# Patient Record
Sex: MALE | Race: WHITE | HISPANIC OR LATINO | Employment: FULL TIME | URBAN - METROPOLITAN AREA
[De-identification: names, ages, dates, MRNs, and addresses within clinical notes are randomized per-mention and may not be internally consistent; named-entity substitution may affect disease eponyms.]

---

## 2023-06-20 ENCOUNTER — OFFICE VISIT (OUTPATIENT)
Dept: PRIMARY CARE CLINIC | Facility: CLINIC | Age: 43
End: 2023-06-20
Payer: COMMERCIAL

## 2023-06-20 VITALS
BODY MASS INDEX: 27.61 KG/M2 | TEMPERATURE: 98 F | HEART RATE: 92 BPM | WEIGHT: 192.88 LBS | SYSTOLIC BLOOD PRESSURE: 112 MMHG | OXYGEN SATURATION: 98 % | HEIGHT: 70 IN | DIASTOLIC BLOOD PRESSURE: 78 MMHG

## 2023-06-20 DIAGNOSIS — F41.9 ANXIETY: ICD-10-CM

## 2023-06-20 DIAGNOSIS — Z76.89 ESTABLISHING CARE WITH NEW DOCTOR, ENCOUNTER FOR: Primary | ICD-10-CM

## 2023-06-20 DIAGNOSIS — F32.A DEPRESSION, UNSPECIFIED DEPRESSION TYPE: ICD-10-CM

## 2023-06-20 DIAGNOSIS — F90.9 ATTENTION DEFICIT HYPERACTIVITY DISORDER (ADHD), UNSPECIFIED ADHD TYPE: ICD-10-CM

## 2023-06-20 PROCEDURE — 3074F SYST BP LT 130 MM HG: CPT | Mod: CPTII,S$GLB,, | Performed by: STUDENT IN AN ORGANIZED HEALTH CARE EDUCATION/TRAINING PROGRAM

## 2023-06-20 PROCEDURE — 99999 PR PBB SHADOW E&M-NEW PATIENT-LVL V: CPT | Mod: PBBFAC,,, | Performed by: STUDENT IN AN ORGANIZED HEALTH CARE EDUCATION/TRAINING PROGRAM

## 2023-06-20 PROCEDURE — 3074F PR MOST RECENT SYSTOLIC BLOOD PRESSURE < 130 MM HG: ICD-10-PCS | Mod: CPTII,S$GLB,, | Performed by: STUDENT IN AN ORGANIZED HEALTH CARE EDUCATION/TRAINING PROGRAM

## 2023-06-20 PROCEDURE — 99999 PR PBB SHADOW E&M-NEW PATIENT-LVL V: ICD-10-PCS | Mod: PBBFAC,,, | Performed by: STUDENT IN AN ORGANIZED HEALTH CARE EDUCATION/TRAINING PROGRAM

## 2023-06-20 PROCEDURE — 99386 PREV VISIT NEW AGE 40-64: CPT | Mod: S$GLB,,, | Performed by: STUDENT IN AN ORGANIZED HEALTH CARE EDUCATION/TRAINING PROGRAM

## 2023-06-20 PROCEDURE — 99386 PR PREVENTIVE VISIT,NEW,40-64: ICD-10-PCS | Mod: S$GLB,,, | Performed by: STUDENT IN AN ORGANIZED HEALTH CARE EDUCATION/TRAINING PROGRAM

## 2023-06-20 PROCEDURE — 3078F PR MOST RECENT DIASTOLIC BLOOD PRESSURE < 80 MM HG: ICD-10-PCS | Mod: CPTII,S$GLB,, | Performed by: STUDENT IN AN ORGANIZED HEALTH CARE EDUCATION/TRAINING PROGRAM

## 2023-06-20 PROCEDURE — 3078F DIAST BP <80 MM HG: CPT | Mod: CPTII,S$GLB,, | Performed by: STUDENT IN AN ORGANIZED HEALTH CARE EDUCATION/TRAINING PROGRAM

## 2023-06-20 PROCEDURE — 3008F PR BODY MASS INDEX (BMI) DOCUMENTED: ICD-10-PCS | Mod: CPTII,S$GLB,, | Performed by: STUDENT IN AN ORGANIZED HEALTH CARE EDUCATION/TRAINING PROGRAM

## 2023-06-20 PROCEDURE — 1159F MED LIST DOCD IN RCRD: CPT | Mod: CPTII,S$GLB,, | Performed by: STUDENT IN AN ORGANIZED HEALTH CARE EDUCATION/TRAINING PROGRAM

## 2023-06-20 PROCEDURE — 3008F BODY MASS INDEX DOCD: CPT | Mod: CPTII,S$GLB,, | Performed by: STUDENT IN AN ORGANIZED HEALTH CARE EDUCATION/TRAINING PROGRAM

## 2023-06-20 PROCEDURE — 1159F PR MEDICATION LIST DOCUMENTED IN MEDICAL RECORD: ICD-10-PCS | Mod: CPTII,S$GLB,, | Performed by: STUDENT IN AN ORGANIZED HEALTH CARE EDUCATION/TRAINING PROGRAM

## 2023-06-20 RX ORDER — DEXTROAMPHETAMINE SACCHARATE, AMPHETAMINE ASPARTATE, DEXTROAMPHETAMINE SULFATE AND AMPHETAMINE SULFATE 3.75; 3.75; 3.75; 3.75 MG/1; MG/1; MG/1; MG/1
TABLET ORAL
COMMUNITY

## 2023-06-20 RX ORDER — ARIPIPRAZOLE 30 MG/1
TABLET ORAL
COMMUNITY

## 2023-06-20 NOTE — PROGRESS NOTES
Harsha García  1980        Subjective     Chief Complaint: Est Care    History of Present Illness:  Mr. Harsha García is a 43 y.o. male who presents to clinic for est care.    ADHD/Depression/Anxiety-On Adderall and Abilify. Was on Lexapro as well but recently stopped. Felling less jittery without it. Seeing Psychiatry [facility]. Needs new one. Would be open to BHI. Kids and work biggest stress.     Brother with Type I DM.  Mom with renal transplant. Unsure etiology.     No chest pain or SOB.   No change in BMs. Denies blood in stools.     Overall feeling well.     Review of Systems   Constitutional:  Negative for chills, fever and malaise/fatigue.   HENT:  Negative for congestion and sore throat.    Respiratory:  Negative for cough and shortness of breath.    Cardiovascular:  Negative for chest pain and leg swelling.   Gastrointestinal:  Negative for abdominal pain, diarrhea, nausea and vomiting.   Skin:  Negative for rash.   Neurological:  Negative for dizziness and headaches.      PAST HISTORY:     Past Medical History:   Diagnosis Date    ADHD        Past Surgical History:   Procedure Laterality Date    LASIK Bilateral        Family History   Problem Relation Age of Onset    Hypertension Mother     Kidney failure Mother     Diabetes type I Brother        Social History     Socioeconomic History    Marital status:    Tobacco Use    Smoking status: Never     Passive exposure: Never    Smokeless tobacco: Never       MEDICATIONS & ALLERGIES:     Current Outpatient Medications on File Prior to Visit   Medication Sig    ARIPiprazole (ABILIFY) 30 MG Tab     dextroamphetamine-amphetamine (ADDERALL) 15 mg tablet      No current facility-administered medications on file prior to visit.       Review of patient's allergies indicates:  No Known Allergies    OBJECTIVE:     Vital Signs:  Vitals:    06/20/23 1250   BP: 112/78   BP Location: Left arm   Patient Position: Sitting   BP Method: Large  "(Manual)   Pulse: 92   Temp: 98.3 °F (36.8 °C)   TempSrc: Oral   SpO2: 98%   Weight: 87.5 kg (192 lb 14.4 oz)   Height: 5' 10" (1.778 m)       Body mass index is 27.68 kg/m².     Physical Exam:  Physical Exam  Vitals and nursing note reviewed.   Constitutional:       General: He is not in acute distress.     Appearance: Normal appearance. He is not ill-appearing, toxic-appearing or diaphoretic.   HENT:      Head: Normocephalic and atraumatic.      Right Ear: Tympanic membrane, ear canal and external ear normal. There is no impacted cerumen.      Left Ear: Tympanic membrane, ear canal and external ear normal. There is no impacted cerumen.      Mouth/Throat:      Mouth: Mucous membranes are moist.      Pharynx: No oropharyngeal exudate.   Eyes:      General: No scleral icterus.        Right eye: No discharge.         Left eye: No discharge.      Conjunctiva/sclera: Conjunctivae normal.   Cardiovascular:      Rate and Rhythm: Normal rate and regular rhythm.      Heart sounds: No murmur heard.  Pulmonary:      Effort: Pulmonary effort is normal.      Breath sounds: Normal breath sounds. No wheezing.   Musculoskeletal:      Cervical back: Normal range of motion and neck supple. No rigidity.      Right lower leg: No edema.      Left lower leg: No edema.   Lymphadenopathy:      Cervical: No cervical adenopathy.   Skin:     General: Skin is warm and dry.   Neurological:      Mental Status: He is alert and oriented to person, place, and time.   Psychiatric:         Mood and Affect: Mood and affect normal.         Behavior: Behavior normal.          Laboratory  No results found for: WBC, HGB, HCT, MCV, PLT  No results found for: GLU, NA, K, CL, CO2, BUN, CREATININE, CALCIUM, MG  No results found for: INR, PROTIME  No results found for: HGBA1C        Health Maintenance         Date Due Completion Date    Hepatitis C Screening Never done ---    Lipid Panel Never done ---    HIV Screening Never done ---    TETANUS VACCINE Never " done ---    Hemoglobin A1c (Diabetic Prevention Screening) Never done ---              ASSESSMENT & PLAN:   Mr. Harsha García is a 43 y.o. male who was seen today in clinic for [facility] care.       1. Establishing care with new doctor, encounter for  -     CBC Auto Differential; Future; Expected date: 06/20/2023  -     Comprehensive Metabolic Panel; Future; Expected date: 06/20/2023  -     Hemoglobin A1C; Future; Expected date: 06/20/2023  -     Lipid Panel; Future; Expected date: 06/20/2023  -     TSH; Future; Expected date: 06/20/2023  -     Hepatitis C Antibody; Future; Expected date: 06/20/2023  -     HIV 1/2 Ag/Ab (4th Gen); Future; Expected date: 06/20/2023  -     Ambulatory referral/consult to Primary Care Behavioral Health (Non-Opioids); Future; Expected date: 06/27/2023  -     Ambulatory referral/consult to Psychology; Future; Expected date: 06/27/2023    2. Attention deficit hyperactivity disorder (ADHD), unspecified ADHD type    3. Anxiety  -     Ambulatory referral/consult to Primary Care Behavioral Health (Non-Opioids); Future; Expected date: 06/27/2023  -     Ambulatory referral/consult to Psychology; Future; Expected date: 06/27/2023    4. Depression, unspecified depression type  -     Ambulatory referral/consult to Primary Care Behavioral Health (Non-Opioids); Future; Expected date: 06/27/2023  -     Ambulatory referral/consult to Psychology; Future; Expected date: 06/27/2023           Barbie Velazquez MD  Internal Medicine         Portions of this note may have been generated using voice recognition software.  Please excuse any spelling/grammatical errors. Occasional wrong-word or sound-a-like substitutions may have also occurred due to the inherent limitations of voice recognition software. Please read the chart carefully and recognize, using context, where substitutions have occurred.

## 2023-06-21 ENCOUNTER — PATIENT MESSAGE (OUTPATIENT)
Dept: BEHAVIORAL HEALTH | Facility: CLINIC | Age: 43
End: 2023-06-21
Payer: COMMERCIAL

## 2023-06-21 ENCOUNTER — LAB VISIT (OUTPATIENT)
Dept: LAB | Facility: HOSPITAL | Age: 43
End: 2023-06-21
Payer: COMMERCIAL

## 2023-06-21 DIAGNOSIS — Z76.89 ESTABLISHING CARE WITH NEW DOCTOR, ENCOUNTER FOR: ICD-10-CM

## 2023-06-21 LAB
ALBUMIN SERPL BCP-MCNC: 4.2 G/DL (ref 3.5–5.2)
ALP SERPL-CCNC: 59 U/L (ref 55–135)
ALT SERPL W/O P-5'-P-CCNC: 32 U/L (ref 10–44)
ANION GAP SERPL CALC-SCNC: 13 MMOL/L (ref 8–16)
AST SERPL-CCNC: 20 U/L (ref 10–40)
BASOPHILS # BLD AUTO: 0.04 K/UL (ref 0–0.2)
BASOPHILS NFR BLD: 0.5 % (ref 0–1.9)
BILIRUB SERPL-MCNC: 0.3 MG/DL (ref 0.1–1)
BUN SERPL-MCNC: 14 MG/DL (ref 6–20)
CALCIUM SERPL-MCNC: 9.5 MG/DL (ref 8.7–10.5)
CHLORIDE SERPL-SCNC: 107 MMOL/L (ref 95–110)
CHOLEST SERPL-MCNC: 168 MG/DL (ref 120–199)
CHOLEST/HDLC SERPL: 3.5 {RATIO} (ref 2–5)
CO2 SERPL-SCNC: 24 MMOL/L (ref 23–29)
CREAT SERPL-MCNC: 1.2 MG/DL (ref 0.5–1.4)
DIFFERENTIAL METHOD: ABNORMAL
EOSINOPHIL # BLD AUTO: 0.2 K/UL (ref 0–0.5)
EOSINOPHIL NFR BLD: 3 % (ref 0–8)
ERYTHROCYTE [DISTWIDTH] IN BLOOD BY AUTOMATED COUNT: 12.5 % (ref 11.5–14.5)
EST. GFR  (NO RACE VARIABLE): >60 ML/MIN/1.73 M^2
ESTIMATED AVG GLUCOSE: 105 MG/DL (ref 68–131)
GLUCOSE SERPL-MCNC: 100 MG/DL (ref 70–110)
HBA1C MFR BLD: 5.3 % (ref 4–5.6)
HCT VFR BLD AUTO: 46.1 % (ref 40–54)
HCV AB SERPL QL IA: NORMAL
HDLC SERPL-MCNC: 48 MG/DL (ref 40–75)
HDLC SERPL: 28.6 % (ref 20–50)
HGB BLD-MCNC: 15.2 G/DL (ref 14–18)
HIV 1+2 AB+HIV1 P24 AG SERPL QL IA: NORMAL
IMM GRANULOCYTES # BLD AUTO: 0.04 K/UL (ref 0–0.04)
IMM GRANULOCYTES NFR BLD AUTO: 0.5 % (ref 0–0.5)
LDLC SERPL CALC-MCNC: 109 MG/DL (ref 63–159)
LYMPHOCYTES # BLD AUTO: 2.5 K/UL (ref 1–4.8)
LYMPHOCYTES NFR BLD: 33.7 % (ref 18–48)
MCH RBC QN AUTO: 31.7 PG (ref 27–31)
MCHC RBC AUTO-ENTMCNC: 33 G/DL (ref 32–36)
MCV RBC AUTO: 96 FL (ref 82–98)
MONOCYTES # BLD AUTO: 0.6 K/UL (ref 0.3–1)
MONOCYTES NFR BLD: 8.6 % (ref 4–15)
NEUTROPHILS # BLD AUTO: 4 K/UL (ref 1.8–7.7)
NEUTROPHILS NFR BLD: 53.7 % (ref 38–73)
NONHDLC SERPL-MCNC: 120 MG/DL
NRBC BLD-RTO: 0 /100 WBC
PLATELET # BLD AUTO: 221 K/UL (ref 150–450)
PMV BLD AUTO: 10.4 FL (ref 9.2–12.9)
POTASSIUM SERPL-SCNC: 4.4 MMOL/L (ref 3.5–5.1)
PROT SERPL-MCNC: 7.3 G/DL (ref 6–8.4)
RBC # BLD AUTO: 4.79 M/UL (ref 4.6–6.2)
SODIUM SERPL-SCNC: 144 MMOL/L (ref 136–145)
TRIGL SERPL-MCNC: 55 MG/DL (ref 30–150)
TSH SERPL DL<=0.005 MIU/L-ACNC: 1.95 UIU/ML (ref 0.4–4)
WBC # BLD AUTO: 7.41 K/UL (ref 3.9–12.7)

## 2023-06-21 PROCEDURE — 87389 HIV-1 AG W/HIV-1&-2 AB AG IA: CPT | Performed by: STUDENT IN AN ORGANIZED HEALTH CARE EDUCATION/TRAINING PROGRAM

## 2023-06-21 PROCEDURE — 36415 COLL VENOUS BLD VENIPUNCTURE: CPT | Mod: PN | Performed by: STUDENT IN AN ORGANIZED HEALTH CARE EDUCATION/TRAINING PROGRAM

## 2023-06-21 PROCEDURE — 83036 HEMOGLOBIN GLYCOSYLATED A1C: CPT | Performed by: STUDENT IN AN ORGANIZED HEALTH CARE EDUCATION/TRAINING PROGRAM

## 2023-06-21 PROCEDURE — 80061 LIPID PANEL: CPT | Performed by: STUDENT IN AN ORGANIZED HEALTH CARE EDUCATION/TRAINING PROGRAM

## 2023-06-21 PROCEDURE — 85025 COMPLETE CBC W/AUTO DIFF WBC: CPT | Performed by: STUDENT IN AN ORGANIZED HEALTH CARE EDUCATION/TRAINING PROGRAM

## 2023-06-21 PROCEDURE — 84443 ASSAY THYROID STIM HORMONE: CPT | Performed by: STUDENT IN AN ORGANIZED HEALTH CARE EDUCATION/TRAINING PROGRAM

## 2023-06-21 PROCEDURE — 80053 COMPREHEN METABOLIC PANEL: CPT | Performed by: STUDENT IN AN ORGANIZED HEALTH CARE EDUCATION/TRAINING PROGRAM

## 2023-06-21 PROCEDURE — 86803 HEPATITIS C AB TEST: CPT | Performed by: STUDENT IN AN ORGANIZED HEALTH CARE EDUCATION/TRAINING PROGRAM

## 2023-06-27 ENCOUNTER — TELEPHONE (OUTPATIENT)
Dept: BEHAVIORAL HEALTH | Facility: CLINIC | Age: 43
End: 2023-06-27
Payer: COMMERCIAL

## 2023-06-27 ENCOUNTER — PATIENT MESSAGE (OUTPATIENT)
Dept: BEHAVIORAL HEALTH | Facility: CLINIC | Age: 43
End: 2023-06-27
Payer: COMMERCIAL

## 2023-06-27 NOTE — PROGRESS NOTES
CHW received call back from patient. Pt sees Dr. Kishore Coppola outside of [facility] and ADHD is well-controlled on medications. Pt would like to address his stress from job, kids and family in BHI Program. Schedule pt on 7/5/2023 at 12:00pm virtual visit.  Sent intake assessments to patient portal. Pt will complete them this afternoon. Discussed 988/911/ED.    Behavioral Health Community Health Worker  Initial Assessment  Completed by:  Malini Erickson     Date:  6/27/2023    Patient Enrollment in Behavioral Health Program:  Patient verbalized understanding of Behavioral Health Integration services to include:  Patient understands that CHW, LCSW, PharmD and consulting Psychiatrist are members of the care team working collaboratively with his/her primary care provider: Yes  Patient understands that activation of their [facility] patient portal account is required for accessing the full scope of team services: Yes  Patient understands that some counseling sessions may occur via video: Yes  Clinic visits with the psychiatrist may be subject to a co-pay based on your insurance: Yes  Patient consents to enroll in BHI program: Yes    Assessments     Single Item Health Literacy Scale:  How often do you need to have someone help you read instructions, pamphlets or other written material from your doctor or pharmacy?: Never    Promis 10:  Promis 10 Responses  In general, would you say your health is: Good  In general, would you say your quality of life is: Fair  In general, how would you rate your physical health?: Very good  In general, how would you rate your mental health, including your mood and your ability to think?: Poor  In general, how would you rate your satisfaction with your social activities and relationships?: Fair  In general, please rate how well you carry out your usual social activities and roles. (This includes activities at home, at work and in your community, and responsibilities as a parent,  "child, spouse, employee, friend, etc.): Fair  To what extent are you able to carry out your everyday physical activities such as walking, climbing stairs, carrying groceries, or moving a chair? : Completely  How often have you been bothered by emotional problems such as feeling anxious, depressed or irritable?: Always  In the past 7 days, how would you rate your fatigue on average?: Severe  In the past 7 days, on a scale of 0 to 10 (where 0 is no pain and 10 is the worst pain imaginable) how would you rate your pain on average?: 0  Global Physical Health: 13  Global Mental health Score: 10    Depression PHQ8 on 6/27/2023 at 1:10pm score is "14"  No flowsheet data found.      Generalized Anxiety Disorder 7-Item Scale:  GAD7 6/27/2023   1. Feeling nervous, anxious, or on edge? 3   2. Not being able to stop or control worrying? 3   3. Worrying too much about different things? 2   4. Trouble relaxing? 2   5. Being so restless that it is hard to sit still? 2   6. Becoming easily annoyed or irritable? 2   7. Feeling afraid as if something awful might happen? 0   CRIS-7 Score 14       History     Social History     Socioeconomic History    Marital status:    Tobacco Use    Smoking status: Never     Passive exposure: Never    Smokeless tobacco: Never       Call Summary     Addendum:   Patient was referred to the BHI (Non-opioid) program by Primary Care Provider, Dr. Barbie Velazquez.  CHW contacted Harsha García who reports depression and anxiety that limits his activities of daily living (ADLs).   Patient scored "14" on the PHQ9 and "14" on the CRIS 7. Based on these scores patient is eligible for the Behavioral Health Integration (Non-opioid) Program. CHW completed the intake and scheduled a virtual appointment for patient with Aleksey Heller LPC on Wednesday, 7/5/2023 at 12:00pm. Patient completed assessments via patient portal, Discussed 932/060/ED. Patient sees Dr. Jian Coppola outside of [facility] " for ADHD medications. Patient stated his ADHD is well managed on medications but he has having stress with his work, kids and family and would like to work on those areas. Patient denies SI/HI.

## 2023-06-28 NOTE — PROGRESS NOTES
"Primary Care Behavioral Health Integration: Initial  Date:  07/05/2023  Patient Name: Harsha García  Referral Source:  Barbie Velazquez MD  Type of Visit:  Video Session    Visit type: Virtual visit with synchronous audio and video  The patient location is:  car  The patient location [place] is:  [place]  The patient phone number is: [phone]    Each patient to whom he or she provides medical services by telemedicine is:  (1) informed of the relationship between the physician and patient and the respective role of any other health care provider with respect to management of the patient; and (2) notified that he or she may decline to receive medical services by telemedicine and may withdraw from such care at any time.    Chief complaint/reason for encounter: anger, anxiety, and interpersonal      History of Present Illness:  Harsha García, a 43 y.o.  male with history of Anxiety disorders; anxiety, unspecified [F41.9] and Attention-Deficit Hyperactivity Disorder: Predominantly Inattentive Subtype (F90.2) referred by Barbie Velazquez MD.  Patient was seen, examined and chart was reviewed.    Met with patient     Patient began this Initial Assessment by stating [facility].  That therapist left for a high-paying job.  Stated he has not seen a therapist since.  Stated he has been diagnosed with Attention Deficit Hyperactivity Disorder, Inattentive Type.  Noted he experiences a lot of anxiety and stress as it relates to his current job as an  and his daily interactions with his children.  Stated his children are "very challenging."  Patient has a son who is 9 years old and a daughter who is 13 years old.  Reported he has always had a strained relationship with daughter.  Both son and daughter, according to patient, have been diagnosed with ADHD.  Stated he is very dissatisfied with job.  He was fired from former job in February 2023 because "my work was " "sloppy, and they had found out I was billing for cases I had not seen."  Stated in his current job, he remains stressed, he is not motivated to get work done, and he has to endure a pay cut from previous job.  He is looking for a new job in the same line of work.  Is considering going back to work at [employer] where he enjoyed the work he did there.  Noted his relationship with daughter, in particular, is very strained.  In addition having been diagnosed with ADHD, daughter has also been diagnosed with a mood disorder.  She is currently in therapy.  He has attended some of her sessions to get a better understanding of what is going on with her.  There is resentment between patient and his wife.  He feels wife does a much better job of dealing with their children, would like to be more like her and have her temperament, but it has been very difficult.  Is also undergoing financial difficulties because of a lower-paying job.  [facility] sent patient Tool 1 - Identification of Triggers to Anxiety.  Will review during follow-up session.        Past Medical History:   Diagnosis Date    ADHD          Current Outpatient Medications:     ARIPiprazole (ABILIFY) 30 MG Tab, , Disp: , Rfl:     dextroamphetamine-amphetamine (ADDERALL) 15 mg tablet, , Disp: , Rfl:     Current Symptoms:  Depression: worthlessness/guilt, hopelessness, fatigue, and difficulty concentrating.    Anxiety: excessive worrying and restlessness.    Sleep:  Reported no problems with sleep .    Chrissie/Hypomania:  denies.    Psychosis: denies .  Suicidal thoughts/behaviors: Denied Confirmed    Patient noted agreement to call 911/and or present to the ED if she experienced suicidal or homicidal ideation, plan or intent.    Self-injury:  Denied. Confirmed.  Trauma: Denied. Confirmed.    Risk Assessment:  Patient reports no suicidal ideation  Patient reports no homicidal ideation  Patient reports no self-injurious behavior  Patient reports no violent behavior    Patient " advised to call [phone] or present the the nearest ED if they experience suicidal or homicidal ideation, plan or intent.    Discussed and agreed on a safety plan.    Psychiatric History:  Is the patient taking psychiatric medication: Yes - Adderall 15 mg and Abilify 30 mg.  Pt is taking Adderall 15 mg for ADHD.  Pt is taking Abilify for 30 mg  They are not interested in medication changes.  Previous Medication Trials: Yes Pt has taken escitalopram (Lexapro) unknown mg once daily for Depression.  Previous Psychiatric Outpatient Treatment:  Yes - [facility] [place] - [place], [place]  Previous Psychiatric Hospitalizations:  No  Previous Suicide Attempts:  No  History of Trauma:  No  Access to a Firearm:  No    Substance Use History:  Tobacco/Nicotine:  No   Alcohol:  beer - few a week on average 5 a week.  Illicit Substances: No  Misuse of Prescription Medications:  No  Caffeine: Yes - coffee     Mental Status Exam  General Appearance:  unremarkable, age appropriate     Speech: normal tone, normal rate, normal pitch, normal volume         Level of Cooperation: cooperative        Thought Processes: normal and logical      Mood: anxious        Thought Content: normal, no suicidality, no homicidality, delusions, or paranoia     Affect: congruent and appropriate    Orientation: Oriented x3     Memory: recent >  intact, remote >  intact     Attention Span & Concentration: intact     Fund of General Knowledge: intact and appropriate to age and level of education   Abstract Reasoning: interpretation of similarities was concrete   Judgment & Insight: good       Language:  intact       Results of the PHQ8 6/27/2023   1. Little interest or pleasure in doing things More than half the days   2. Feeling down, depressed, or hopeless Nearly every day   3. Trouble falling or staying asleep, or sleeping too much Nearly every day   4. Feeling tired or having little energy Nearly every day   5. poor appetite or  overeating Not at all   6. Feeling bad about yourself - or that you are a failure or have let yourself or your family down More than half the days   7. Trouble concentrating on things, such as reading the newspaper or watching television Several days   8. Moving or speaking so slowly that other people could have noticed? Or the opposite - being so fidgety or restless that you have been moving around a lot more than usual Not at all   Total Score  14        GAD7 6/27/2023   1. Feeling nervous, anxious, or on edge? 3   2. Not being able to stop or control worrying? 3   3. Worrying too much about different things? 2   4. Trouble relaxing? 2   5. Being so restless that it is hard to sit still? 2   6. Becoming easily annoyed or irritable? 2   7. Feeling afraid as if something awful might happen? 0   CRIS-7 Score 14       Impression:   My diagnostic impression is patient is experiencing worrying, depressed mood, fatigue, difficulty concentrating, and guilt.  These symptoms are making it difficult for patient to function effectively.      Provisional Diagnosis:  No diagnosis found.    Treatment Goals and Plan: Initial appointment focused on gathering history, identifying treatment goals and developing a treatment plan.      Anxiety: reducing negative automatic thoughts, reducing physical symptoms of anxiety, and reducing time spent worrying (<30 minutes/day)  Wants to develop a more nurturing relationship with daughter; wants to find a new job in the same line of work as an .      Future treatment will utilize CBT, Problem-solving Therapy, and Solution-focused Therapy.      Return to Clinic: 1 week    Plan to discuss case with [facility] consulting psychiatrist and further recommendations to be potentially be made at that time.  Refer to psychiatry    Length of Appointment:  38 minutes spent face-to-face and 15 minutes spent in non face-to-face clinical care.

## 2023-07-03 ENCOUNTER — TELEPHONE (OUTPATIENT)
Dept: BEHAVIORAL HEALTH | Facility: CLINIC | Age: 43
End: 2023-07-03
Payer: COMMERCIAL

## 2023-07-03 ENCOUNTER — PATIENT MESSAGE (OUTPATIENT)
Dept: BEHAVIORAL HEALTH | Facility: CLINIC | Age: 43
End: 2023-07-03
Payer: COMMERCIAL

## 2023-07-03 NOTE — PROGRESS NOTES
CHW reached out to  new pt to remind him of virtual appointment with Aleksey Heller LPC Wednesday. Pt confirmed the appointment.  Sent virtual visit instructions to pt with [phone] if unable to login.

## 2023-07-05 ENCOUNTER — PATIENT MESSAGE (OUTPATIENT)
Dept: BEHAVIORAL HEALTH | Facility: CLINIC | Age: 43
End: 2023-07-05
Payer: COMMERCIAL

## 2023-07-05 ENCOUNTER — CLINICAL SUPPORT (OUTPATIENT)
Dept: BEHAVIORAL HEALTH | Facility: CLINIC | Age: 43
End: 2023-07-05
Payer: COMMERCIAL

## 2023-07-05 DIAGNOSIS — Z76.89 ESTABLISHING CARE WITH NEW DOCTOR, ENCOUNTER FOR: ICD-10-CM

## 2023-07-05 DIAGNOSIS — F41.9 ANXIETY: ICD-10-CM

## 2023-07-05 DIAGNOSIS — F90.0 ADHD (ATTENTION DEFICIT HYPERACTIVITY DISORDER), INATTENTIVE TYPE: ICD-10-CM

## 2023-07-05 DIAGNOSIS — F32.A DEPRESSION, UNSPECIFIED DEPRESSION TYPE: ICD-10-CM

## 2023-07-05 DIAGNOSIS — F41.9 ANXIETY DISORDER, UNSPECIFIED TYPE: Primary | ICD-10-CM

## 2023-07-05 PROCEDURE — 90791 PR PSYCHIATRIC DIAGNOSTIC EVALUATION: ICD-10-PCS | Mod: 95,,, | Performed by: COUNSELOR

## 2023-07-05 PROCEDURE — 90791 PSYCH DIAGNOSTIC EVALUATION: CPT | Mod: 95,,, | Performed by: COUNSELOR

## 2023-07-13 ENCOUNTER — TELEPHONE (OUTPATIENT)
Dept: BEHAVIORAL HEALTH | Facility: CLINIC | Age: 43
End: 2023-07-13
Payer: COMMERCIAL

## 2023-07-13 NOTE — PROGRESS NOTES
CHW reached out to pt to remind him of virtual appointment with Aleksey Heller LPC tomorrow. No answer, LVM of the appointment.

## 2023-07-14 ENCOUNTER — PATIENT MESSAGE (OUTPATIENT)
Dept: BEHAVIORAL HEALTH | Facility: CLINIC | Age: 43
End: 2023-07-14
Payer: COMMERCIAL

## 2023-07-14 ENCOUNTER — CLINICAL SUPPORT (OUTPATIENT)
Dept: BEHAVIORAL HEALTH | Facility: CLINIC | Age: 43
End: 2023-07-14
Payer: COMMERCIAL

## 2023-07-14 ENCOUNTER — TELEPHONE (OUTPATIENT)
Dept: BEHAVIORAL HEALTH | Facility: CLINIC | Age: 43
End: 2023-07-14
Payer: COMMERCIAL

## 2023-07-14 DIAGNOSIS — F41.9 ANXIETY DISORDER, UNSPECIFIED TYPE: Primary | ICD-10-CM

## 2023-07-14 PROCEDURE — 90832 PSYTX W PT 30 MINUTES: CPT | Mod: 95,,, | Performed by: COUNSELOR

## 2023-07-14 PROCEDURE — 90832 PR PSYCHOTHERAPY W/PATIENT, 30 MIN: ICD-10-PCS | Mod: 95,,, | Performed by: COUNSELOR
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
## 2023-07-14 NOTE — PROGRESS NOTES
Patient had trouble logging in for 12pm appointment and rescheduled for 130 pm today     Normal rate, regular rhythm, normal S1, S2 heart sounds heard. Normal rate, regular rhythm, normal S1, S2 heart sounds heard.
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
## 2023-07-28 ENCOUNTER — TELEPHONE (OUTPATIENT)
Dept: BEHAVIORAL HEALTH | Facility: CLINIC | Age: 43
End: 2023-07-28
Payer: COMMERCIAL

## 2023-07-28 ENCOUNTER — PATIENT MESSAGE (OUTPATIENT)
Dept: BEHAVIORAL HEALTH | Facility: CLINIC | Age: 43
End: 2023-07-28
Payer: COMMERCIAL

## 2023-07-28 NOTE — PROGRESS NOTES
CHW reached out to pt to remind him of virtual appointment with Aleksey Heller LPC tomorrow. No answer, LVM of the appointment.  Sent patient portal message appointment reminder.

## 2023-07-31 ENCOUNTER — PATIENT MESSAGE (OUTPATIENT)
Dept: BEHAVIORAL HEALTH | Facility: CLINIC | Age: 43
End: 2023-07-31

## 2023-07-31 ENCOUNTER — CLINICAL SUPPORT (OUTPATIENT)
Dept: BEHAVIORAL HEALTH | Facility: CLINIC | Age: 43
End: 2023-07-31
Payer: COMMERCIAL

## 2023-07-31 DIAGNOSIS — F41.9 ANXIETY DISORDER, UNSPECIFIED TYPE: Primary | ICD-10-CM

## 2023-07-31 PROCEDURE — 90832 PR PSYCHOTHERAPY W/PATIENT, 30 MIN: ICD-10-PCS | Mod: 95,,, | Performed by: COUNSELOR

## 2023-07-31 PROCEDURE — 90832 PSYTX W PT 30 MINUTES: CPT | Mod: 95,,, | Performed by: COUNSELOR

## 2023-08-11 ENCOUNTER — PATIENT MESSAGE (OUTPATIENT)
Dept: BEHAVIORAL HEALTH | Facility: CLINIC | Age: 43
End: 2023-08-11
Payer: COMMERCIAL

## 2023-08-14 ENCOUNTER — TELEPHONE (OUTPATIENT)
Dept: BEHAVIORAL HEALTH | Facility: CLINIC | Age: 43
End: 2023-08-14
Payer: COMMERCIAL

## 2023-08-14 NOTE — PROGRESS NOTES
"Primary Care Behavioral Health Integration: Follow-up  Date:  08/15/2023  Patient Name: Harsha García  Referral Source:  Barbie Velazquez MD  Type of Visit:  Video Session  Site:  [facility]    Visit type: Virtual visit with synchronous audio and video  The patient location is:  car  The patient location Parish is:  [facility]  The patient phone number is: [phone]    Each patient to whom he or she provides medical services by telemedicine is:  (1) informed of the relationship between the physician and patient and the respective role of any other health care provider with respect to management of the patient; and (2) notified that he or she may decline to receive medical services by telemedicine and may withdraw from such care at any time.    History of Present Illness:  Harsha García, a 43 y.o.  male with history of Anxiety disorders; anxiety, unspecified [F41.9] referred by Barbie Velazquez MD.  Patient was seen, examined and chart was reviewed.    Met with patient.       Patient began this session by stating his entire family had COVID except him.  He has been taking care of them as needed.  Patient reported he drove his daughter, Jasper, to her last therapy session 10 days ago.  Daughter's therapist discussed with patient the fact that daughter has been reluctant to participate in therapy sessions and therapist does not know the reasons.  One takeaway from that discussion was that patient and wife are to maintain very consistent rules which prevent her from engaging in violent behavior with mother.  LPC and patient reviewed the Thinking Errors sheet and two thinking error in particular:  "blowing things up" and "ignoring the good."  Patient used the example of having to take a deposition in [place].  NOTE:  At this point in the session, LPC and patient experienced technical difficulties during which video portion of the session froze.  LPC then switched to an audio session.  For " "deposition, patient reported he was focused on everything going wrong from not asking the "right" questions to not getting enough information from his client.  Noted he has often struggled with having to "think on his feet," which he feels is a necessary attribute to being a .  The second example he used was his interactions with his daughter, Jasper, and how he often ignores the good she does.  LPC encouraged patient to begin visualizing the REACTION lead to TOXICITY concept BEFORE actually reacting to his daughter.  Also encouraged patient and his wife to discuss a structured reward system to implement with daughter for doing good.  LPC sent patient the Tool 4 - Generate A List of Unhealthy Go-To Coping Skills of the CBT Toolbox workbook.  Will review patient's completed worksheet during follow-up appointment on August 30, 2023.          Results of the PHQ8 7/31/2023 7/3/2023 6/27/2023   1. Little interest or pleasure in doing things Several days More than half the days More than half the days   2. Feeling down, depressed, or hopeless Several days Nearly every day Nearly every day   3. Trouble falling or staying asleep, or sleeping too much Several days Nearly every day Nearly every day   4. Feeling tired or having little energy More than half the days Nearly every day Nearly every day   5. poor appetite or overeating Not at all Not at all Not at all   6. Feeling bad about yourself - or that you are a failure or have let yourself or your family down Nearly every day Nearly every day More than half the days   7. Trouble concentrating on things, such as reading the newspaper or watching television Several days Not at all Several days   8. Moving or speaking so slowly that other people could have noticed? Or the opposite - being so fidgety or restless that you have been moving around a lot more than usual Not at all Not at all Not at all   Total Score  9 14 14       GAD7 7/31/2023 7/3/2023 6/27/2023   1. " Feeling nervous, anxious, or on edge? 1 3 3   2. Not being able to stop or control worrying? 1 3 3   3. Worrying too much about different things? 1 3 2   4. Trouble relaxing? 1 3 2   5. Being so restless that it is hard to sit still? 1 2 2   6. Becoming easily annoyed or irritable? 1 3 2   7. Feeling afraid as if something awful might happen? 0 3 0   CRIS-7 Score 6 20 14       Mental Status Exam  General Appearance:  unremarkable, age appropriate     Speech: normal tone, normal rate, normal pitch, normal volume         Level of Cooperation: cooperative        Thought Processes: normal and logical      Mood: anxious        Thought Content: normal, no suicidality, no homicidality, delusions, or paranoia     Affect: congruent and appropriate    Orientation: Oriented x3     Memory: recent >  intact, remote >  intact     Attention Span & Concentration: intact     Fund of General Knowledge: intact and appropriate to age and level of education   Abstract Reasoning: interpretation of similarities was concrete   Judgment & Insight: good       Language:  intact       Treatment plan:  Target symptoms: anxiety , work stress  Why chosen therapy is appropriate versus another modality: relevant to diagnosis  Outcome monitoring methods: self-report  Therapeutic intervention type: insight oriented psychotherapy, behavior modifying psychotherapy, supportive psychotherapy    Risk parameters:  Patient reports no suicidal ideation  Patient reports no homicidal ideation  Patient reports no self-injurious behavior  Patient reports no violent behavior    Verbal deficits: None    Patient's response to intervention:  The patient's response to intervention is accepting.    Progress toward goals and other mental status changes:  The patient's progress toward goals is fair .    Patient advised to call [phone] or present the the nearest ED if they experience suicidal or homicidal ideation, plan or intent.       Impression:  My diagnostic  impression is patient continues to experience excessive worrying, difficulty relaxing, difficulty concentrating, and feeling on edge.  These symptoms are making it difficult for patient to function effectively.    Diagnosis:   Generalized Anxiety Disorder [F41.1]    Treatment Goals and Plan: Pt plans to continue CBT, Problem-solving Therapy, and Solution-focused Therapy    Future treatment will utilize CBT, Problem-solving Therapy, and Solution-focused Therapy    Return to Clinic: 2 weeks    Plan to discuss case with [facility] consulting psychiatrist and further recommendations to be potentially be made at that time.  Refer to psychiatry    Length of Appointment:  35 minutes spent face-to-face and 12 minutes spent in non face-to-face clinical care.

## 2023-08-15 ENCOUNTER — CLINICAL SUPPORT (OUTPATIENT)
Dept: BEHAVIORAL HEALTH | Facility: CLINIC | Age: 43
End: 2023-08-15
Payer: COMMERCIAL

## 2023-08-15 ENCOUNTER — PATIENT MESSAGE (OUTPATIENT)
Dept: BEHAVIORAL HEALTH | Facility: CLINIC | Age: 43
End: 2023-08-15

## 2023-08-15 DIAGNOSIS — F41.1 GENERALIZED ANXIETY DISORDER: Primary | ICD-10-CM

## 2023-08-15 PROCEDURE — 90832 PSYTX W PT 30 MINUTES: CPT | Mod: 95,,, | Performed by: COUNSELOR

## 2023-08-15 PROCEDURE — 90832 PR PSYCHOTHERAPY W/PATIENT, 30 MIN: ICD-10-PCS | Mod: 95,,, | Performed by: COUNSELOR

## 2023-08-28 NOTE — PROGRESS NOTES
"Primary Care Behavioral Health Integration: Follow-up  Date:  08/30/2023  Patient Name: Harsha García  Referral Source:  Barbie Velazquez MD  Type of Visit:  Video Session  Site:  [facility]    Visit type: Virtual visit with synchronous audio and video  The patient location is:  car ([place] and [place])  The patient location [place] is:  [facility]   The patient phone number is: [phone]    Each patient to whom he or she provides medical services by telemedicine is:  (1) informed of the relationship between the physician and patient and the respective role of any other health care provider with respect to management of the patient; and (2) notified that he or she may decline to receive medical services by telemedicine and may withdraw from such care at any time.    History of Present Illness:  Harsha García, a 43 y.o.  male with history of Anxiety disorders; generalized anxiety disorder [F41.1] referred by Barbie Velazquez MD.  Patient was seen, examined and chart was reviewed.    Met with patient.       The first 17 minutes of this session was conducted via video.  Technical issues began around the 17 minute isael.  LPC then contacted patient via telephone to complete the remainder of the session.  Total face-to-face time LPC spent with patient was 38 minutes.  Patient began this session by stating he has been "super busy" with getting kids to school, son starting soccer, and work commitments.  Reported his daughter, Jasper, is already experiencing issues with school.  She has refused to do assignments, and she has performed poorly on exams.  Noted she will receive accommodations to assist her.  LPC and patient reviewed patient's completed Tool 4 worksheet.  Patient listed procrastinating as one of his primary unhealthy go-to coping skills.  LPC and patient explored barriers to getting work done.  LPC encouraged patient to begin completing small, manageable tasks in specific time windows with the " given task listed.  LPC sent patient the Tool 5 - Recognition of Consequences of the CBT Toolbox workbook.  Will review patient's completed worksheet during follow-up appointment on September 13, 2023.               8/15/2023    11:58 AM 7/31/2023     9:53 AM 7/3/2023     1:20 PM 6/27/2023     1:11 PM   Results of the PHQ8   Little interest or pleasure in doing things More than half the days Several days More than half the days More than half the days   Feeling down, depressed, or hopeless Nearly every day Several days Nearly every day Nearly every day   Trouble falling or staying asleep, or sleeping too much Not at all Several days Nearly every day Nearly every day   Feeling tired or having little energy More than half the days More than half the days Nearly every day Nearly every day   Poor appetite or overeating Not at all Not at all Not at all Not at all   Feeling bad about yourself - or that you are a failure or have let yourself or your family down Nearly every day Nearly every day Nearly every day More than half the days   Trouble concentrating on things, such as reading the newspaper or watching television More than half the days Several days Not at all Several days   Moving or speaking so slowly that other people could have noticed. Or the opposite - being so fidgety or restless that you have been moving around a lot more than usual Not at all Not at all Not at all Not at all   Total Score  12 9 14 14           8/15/2023    11:58 AM 7/31/2023     9:52 AM 7/3/2023     1:19 PM   GAD7   1. Feeling nervous, anxious, or on edge? 2 1 3   2. Not being able to stop or control worrying? 3 1 3   3. Worrying too much about different things? 3 1 3   4. Trouble relaxing? 3 1 3   5. Being so restless that it is hard to sit still? 3 1 2   6. Becoming easily annoyed or irritable? 2 1 3   7. Feeling afraid as if something awful might happen? 1 0 3   CRIS-7 Score 17 6 20       Mental Status Exam  General Appearance:   unremarkable, age appropriate     Speech: normal tone, normal pitch, normal volume, rapid         Level of Cooperation: cooperative        Thought Processes: normal and logical      Mood: anxious        Thought Content: normal, no suicidality, no homicidality, delusions, or paranoia     Affect: anxious    Orientation: Oriented x3     Memory: recent >  intact, remote >  intact     Attention Span & Concentration: intact     Fund of General Knowledge: intact and appropriate to age and level of education   Abstract Reasoning: interpretation of similarities was concrete   Judgment & Insight: fair       Language:  intact       Treatment plan:  Target symptoms: anxiety , work stress  Why chosen therapy is appropriate versus another modality: relevant to diagnosis  Outcome monitoring methods: self-report  Therapeutic intervention type: insight oriented psychotherapy, behavior modifying psychotherapy, supportive psychotherapy    Risk parameters:  Patient reports no suicidal ideation  Patient reports no homicidal ideation  Patient reports no self-injurious behavior  Patient reports no violent behavior    Verbal deficits: None    Patient's response to intervention:  The patient's response to intervention is accepting.    Progress toward goals and other mental status changes:  The patient's progress toward goals is limited.    Patient advised to call [phone] or present the the nearest ED if they experience suicidal or homicidal ideation, plan or intent.       Impression:  My diagnostic impression is patient continues to experience excessive worrying, difficulty relaxing, difficulty concentrating, and feeling on edge.  These symptoms are making it difficult for patient to function effectively.    Diagnosis:   Anxiety disorders; generalized anxiety disorder [F41.1]    Treatment Goals and Plan: Pt plans to continue CBT, Problem-solving Therapy, and Solution-focused Therapy    Future treatment will utilize CBT, Problem-solving  Therapy, and Solution-focused Therapy    Return to Clinic: 2 weeks    Plan to discuss case with [facility] consulting psychiatrist and further recommendations to be potentially be made at that time.  Refer to psychiatry    Length of Appointment:  38 minutes spent face-to-face and 13 minutes spent in non face-to-face clinical care.

## 2023-08-29 ENCOUNTER — PATIENT MESSAGE (OUTPATIENT)
Dept: BEHAVIORAL HEALTH | Facility: CLINIC | Age: 43
End: 2023-08-29
Payer: COMMERCIAL

## 2023-08-29 ENCOUNTER — TELEPHONE (OUTPATIENT)
Dept: BEHAVIORAL HEALTH | Facility: CLINIC | Age: 43
End: 2023-08-29
Payer: COMMERCIAL

## 2023-08-29 NOTE — PROGRESS NOTES
CHW reached out to pt to remind him of virtual appointment with Aleksey Heller LPC tomorrow. No answer, LVM of the appointment.  Sent portal message reminder.

## 2023-08-30 ENCOUNTER — CLINICAL SUPPORT (OUTPATIENT)
Dept: BEHAVIORAL HEALTH | Facility: CLINIC | Age: 43
End: 2023-08-30
Payer: COMMERCIAL

## 2023-08-30 ENCOUNTER — PATIENT MESSAGE (OUTPATIENT)
Dept: BEHAVIORAL HEALTH | Facility: CLINIC | Age: 43
End: 2023-08-30

## 2023-08-30 DIAGNOSIS — F41.1 GENERALIZED ANXIETY DISORDER: Primary | ICD-10-CM

## 2023-08-30 PROCEDURE — 90834 PSYTX W PT 45 MINUTES: CPT | Mod: 95,,, | Performed by: COUNSELOR

## 2023-08-30 PROCEDURE — 90834 PR PSYCHOTHERAPY W/PATIENT, 45 MIN: ICD-10-PCS | Mod: 95,,, | Performed by: COUNSELOR

## 2023-09-12 ENCOUNTER — TELEPHONE (OUTPATIENT)
Dept: BEHAVIORAL HEALTH | Facility: CLINIC | Age: 43
End: 2023-09-12
Payer: COMMERCIAL

## 2023-09-12 NOTE — PROGRESS NOTES
CHW received call from pt, scheduled follow-up virtual on 9/18/2023 at 12:30pm with Aleksey Heller LPC.

## 2023-09-15 ENCOUNTER — TELEPHONE (OUTPATIENT)
Dept: BEHAVIORAL HEALTH | Facility: CLINIC | Age: 43
End: 2023-09-15
Payer: COMMERCIAL

## 2023-09-15 ENCOUNTER — PATIENT MESSAGE (OUTPATIENT)
Dept: BEHAVIORAL HEALTH | Facility: CLINIC | Age: 43
End: 2023-09-15
Payer: COMMERCIAL

## 2023-09-15 NOTE — PROGRESS NOTES
CHW reached out to pt to remind him of virtual appointment with Aleksey Heller LPC Monday.  No answer, LVM, sent portal reminder of the appointment.

## 2023-09-16 NOTE — PROGRESS NOTES
"Primary Care Behavioral Health Integration: Follow-up  Date:  9/16/2023  Patient Name: Harsha García  Referral Source:  Barbie Velazquez MD  Type of Visit:  Video Session  Site:  [facility]    Visit type: Virtual visit with synchronous audio and video  The patient location is:  car ([place] and [place])/after technical difficulties, patient moved to his work office for the remainder of the session.    The patient location [place] is:  [facility]  The patient phone number is: [phone]    Each patient to whom he or she provides medical services by telemedicine is:  (1) informed of the relationship between the physician and patient and the respective role of any other health care provider with respect to management of the patient; and (2) notified that he or she may decline to receive medical services by telemedicine and may withdraw from such care at any time.    History of Present Illness:  Harsha García, a 43 y.o.  male with history of Anxiety disorders; generalized anxiety disorder [F41.1] referred by Barbie Velazquez MD.  Patient was seen, examined and chart was reviewed.    Met with patient.       Patient began this session by stating the situation with his daughter is worsening.  Noted things have not been going well in terms of interaction and communication with daughter.  At this point in this session, LPC and patient experienced technical difficulties with the video.  Patient moved from his car to his office.  The remainder of the session was conducted virtually from patient's work office.  Patient reported going on a weekend guys' trip with friends.  Before that trip, patient stated, "I had a big blow up with my daughter before I left.  It was not good, and my wife is still mad at me."  Patient stated he remains unhappy with work.  He continues to look for other jobs, but he has not heard back from anyone.  Noted when daughter reacts to him, "she amps it up, ratchets up the 'fire' even more, " "and gets my son and my wife upset in the process."  Noted there is so much resentment between him and his daughter.  When asked to discuss positive traits of his daughter, patient noted she can be very funny, and she can be a mike to be around.  However, patient believes his daughter "sees me as a total monster who hates her."  LPC and patient discussed need to have a conversation with wife about what he will do to work on managing the frequency of his verbal altercations with his daughter.  In addition, we discussed having a conversation with his daughter about what goals they expect from each other and what they need to do to meet those goals.  LPC encouraged patient to have a conversation with wife about considering being a /monitor/supporter of ensuring both meet their proposed goals.  LPC sent patient the Tool 6 - Developing Alternative Healthy Coping Skills worksheet of the CBT Toolbox workbook.  Will review patient's completed worksheet during follow-up appointment on October 4, 2023.              8/29/2023     4:16 PM 8/15/2023    11:58 AM 7/31/2023     9:53 AM 7/3/2023     1:20 PM 6/27/2023     1:11 PM   Results of the PHQ8   Little interest or pleasure in doing things More than half the days More than half the days Several days More than half the days More than half the days   Feeling down, depressed, or hopeless More than half the days Nearly every day Several days Nearly every day Nearly every day   Trouble falling or staying asleep, or sleeping too much More than half the days Not at all Several days Nearly every day Nearly every day   Feeling tired or having little energy Nearly every day More than half the days More than half the days Nearly every day Nearly every day   Poor appetite or overeating Not at all Not at all Not at all Not at all Not at all   Feeling bad about yourself - or that you are a failure or have let yourself or your family down Nearly every day Nearly every day Nearly every " day Nearly every day More than half the days   Trouble concentrating on things, such as reading the newspaper or watching television Several days More than half the days Several days Not at all Several days   Moving or speaking so slowly that other people could have noticed. Or the opposite - being so fidgety or restless that you have been moving around a lot more than usual Not at all Not at all Not at all Not at all Not at all   Total Score  13 12 9 14 14           8/29/2023     4:15 PM 8/15/2023    11:58 AM 7/31/2023     9:52 AM   GAD7   1. Feeling nervous, anxious, or on edge? 2 2 1   2. Not being able to stop or control worrying? 1 3 1   3. Worrying too much about different things? 1 3 1   4. Trouble relaxing? 2 3 1   5. Being so restless that it is hard to sit still? 1 3 1   6. Becoming easily annoyed or irritable? 2 2 1   7. Feeling afraid as if something awful might happen? 0 1 0   CRIS-7 Score 9 17 6       Mental Status Exam  General Appearance:  unremarkable, age appropriate     Speech: normal tone, normal rate, normal pitch, normal volume         Level of Cooperation: cooperative        Thought Processes: normal and logical      Mood: steady        Thought Content: normal, no suicidality, no homicidality, delusions, or paranoia     Affect: anxious    Orientation: Oriented x3     Memory: recent >  intact, remote >  intact     Attention Span & Concentration: intact     Fund of General Knowledge: intact and appropriate to age and level of education   Abstract Reasoning: interpretation of similarities was concrete   Judgment & Insight: fair       Language:  intact       Treatment plan:  Target symptoms: anxiety , work stress  Why chosen therapy is appropriate versus another modality: relevant to diagnosis  Outcome monitoring methods: self-report  Therapeutic intervention type: insight oriented psychotherapy, behavior modifying psychotherapy, supportive psychotherapy    Risk parameters:  Patient reports no  suicidal ideation  Patient reports no homicidal ideation  Patient reports no self-injurious behavior  Patient reports no violent behavior    Verbal deficits: None    Patient's response to intervention:  The patient's response to intervention is accepting.    Progress toward goals and other mental status changes:  The patient's progress toward goals is limited.    Patient advised to call [phone] or present the the nearest ED if they experience suicidal or homicidal ideation, plan or intent.       Impression:  My diagnostic impression is patient continues to experience excessive worrying, difficulty relaxing, difficulty concentrating, and feeling on edge.  These symptoms are making it difficult for patient to function effectively.      Diagnosis:   Anxiety disorders; generalized anxiety disorder [F41.1]    Treatment Goals and Plan: Pt plans to continue CBT, Problem-solving Therapy, and Solution-focused Therapy    Future treatment will utilize CBT, Problem-solving Therapy, and Solution-focused Therapy    Return to Clinic: 2 weeks    Plan to discuss case with [facility] consulting psychiatrist and further recommendations to be potentially be made at that time.  Refer to psychiatry    Length of Appointment:  35 minutes spent face-to-face and 13 minutes spent in non face-to-face clinical care.

## 2023-09-18 ENCOUNTER — PATIENT MESSAGE (OUTPATIENT)
Dept: BEHAVIORAL HEALTH | Facility: CLINIC | Age: 43
End: 2023-09-18

## 2023-09-18 ENCOUNTER — CLINICAL SUPPORT (OUTPATIENT)
Dept: BEHAVIORAL HEALTH | Facility: CLINIC | Age: 43
End: 2023-09-18
Payer: COMMERCIAL

## 2023-09-18 DIAGNOSIS — F41.1 GENERALIZED ANXIETY DISORDER: Primary | ICD-10-CM

## 2023-09-18 PROCEDURE — 90832 PR PSYCHOTHERAPY W/PATIENT, 30 MIN: ICD-10-PCS | Mod: 95,,, | Performed by: COUNSELOR

## 2023-09-18 PROCEDURE — 90832 PSYTX W PT 30 MINUTES: CPT | Mod: 95,,, | Performed by: COUNSELOR

## 2023-10-02 NOTE — PROGRESS NOTES
Primary Care Behavioral Health Integration: Follow-up  Date:  10/04/2023  Patient Name: Harsha García  Referral Source:  Barbie Velazquez MD  Type of Visit:  Video Session  Site:  [facility]    Visit type: Virtual visit with synchronous audio and video  The patient location is:  home ([address] [place] [address]  [id number])  The patient location [place] is:  [facility]  The patient phone number is: [phone]    Each patient to whom he or she provides medical services by telemedicine is:  (1) informed of the relationship between the physician and patient and the respective role of any other health care provider with respect to management of the patient; and (2) notified that he or she may decline to receive medical services by telemedicine and may withdraw from such care at any time.    History of Present Illness:  Harsha García, a 43 y.o.  male with history of Anxiety disorders; generalized anxiety disorder [F41.1] referred by Barbie Velazquez MD.  Patient was seen, examined and chart was reviewed.    Met with patient.       Patient began this session by stating his relationship with his daughter has been improving somewhat.  Reported his daughter has been doing poorly academically in school.  She is falling behind turning in her assignments.  Stated he and his wife had a meeting with daughter's  today, and the conclusion reached was that he and his wife have to stay on top of making sure daughter completes her work.  Patient described interacting in a healthy way with his daughter while doing a math project with her.  The math project involved determining the area of her room, painting the walls of her room after the area was determined, and figuring out how much paint she would need to paint the walls.  Stated he was able to work with his daughter in a relatively calm way, and he communicated with her effectively without overreacting.  Stated he felt that he was doing much of the  work for her, but in terms of interaction, it was healthy, unlike previous interactions.  Reported he and his wife will have meetings with daughter's  and .  LPC and patient reviewed his completed Tool 6 -  Developing Alternative Healthy Coping Skills of the CBT model.  Patient reported one alternative healthy coping skill he has used is removing himself from a volatile situation and calming himself down.  Patient did note most of the time when he removes himself from a volatile situation, he is engaging in avoidance behavior.  LPC and patient discussed the importance of removing himself from the situation, but when he returns, confronting the issue at hand.  LPC encouraged patient to communicate with his wife how working on the [employer] project was able to lay the foundation for positive modeling behaviors with daughter.  LPC sent patient the Tool 7 -  Challenging Distorted Thoughts worksheet of the CBT Toolbox workbook.  Will review patient's completed worksheet during follow-up appointment on October 25, 2023.             9/18/2023     9:35 AM 8/29/2023     4:16 PM 8/15/2023    11:58 AM 7/31/2023     9:53 AM 7/3/2023     1:20 PM 6/27/2023     1:11 PM   Results of the PHQ8   Little interest or pleasure in doing things More than half the days More than half the days More than half the days Several days More than half the days More than half the days   Feeling down, depressed, or hopeless Nearly every day More than half the days Nearly every day Several days Nearly every day Nearly every day   Trouble falling or staying asleep, or sleeping too much Several days More than half the days Not at all Several days Nearly every day Nearly every day   Feeling tired or having little energy Nearly every day Nearly every day More than half the days More than half the days Nearly every day Nearly every day   Poor appetite or overeating Not at all Not at all Not at all Not at all Not at all Not at all    Feeling bad about yourself - or that you are a failure or have let yourself or your family down Nearly every day Nearly every day Nearly every day Nearly every day Nearly every day More than half the days   Trouble concentrating on things, such as reading the newspaper or watching television More than half the days Several days More than half the days Several days Not at all Several days   Moving or speaking so slowly that other people could have noticed. Or the opposite - being so fidgety or restless that you have been moving around a lot more than usual Not at all Not at all Not at all Not at all Not at all Not at all   Total Score  14 13 12 9 14 14           9/18/2023     9:34 AM 8/29/2023     4:15 PM 8/15/2023    11:58 AM   GAD7   1. Feeling nervous, anxious, or on edge? 2 2 2   2. Not being able to stop or control worrying? 2 1 3   3. Worrying too much about different things? 2 1 3   4. Trouble relaxing? 1 2 3   5. Being so restless that it is hard to sit still? 1 1 3   6. Becoming easily annoyed or irritable? 2 2 2   7. Feeling afraid as if something awful might happen? 1 0 1   CRIS-7 Score 11 9 17       Mental Status Exam  General Appearance:  unremarkable, age appropriate     Speech: rapid         Level of Cooperation: cooperative        Thought Processes: normal and logical      Mood: anxious        Thought Content: normal, no suicidality, no homicidality, delusions, or paranoia     Affect: anxious    Orientation: Oriented x3     Memory: recent >  intact, remote >  intact     Attention Span & Concentration: intact     Fund of General Knowledge: intact and appropriate to age and level of education   Abstract Reasoning: interpretation of similarities was concrete   Judgment & Insight: good       Language:  intact       Treatment plan:  Target symptoms: anxiety , work stress  Why chosen therapy is appropriate versus another modality: relevant to diagnosis  Outcome monitoring methods: self-report  Therapeutic  intervention type: insight oriented psychotherapy, behavior modifying psychotherapy, supportive psychotherapy    Risk parameters:  Patient reports no suicidal ideation  Patient reports no homicidal ideation  Patient reports no self-injurious behavior  Patient reports no violent behavior    Verbal deficits: None    Patient's response to intervention:  The patient's response to intervention is accepting.    Progress toward goals and other mental status changes:  The patient's progress toward goals is limited.    Patient advised to call [phone] or present the the nearest ED if they experience suicidal or homicidal ideation, plan or intent.       Impression:  My diagnostic impression is patient continues to experience excessive worrying, difficulty relaxing, difficulty concentrating, and feeling on edge as evidenced by fear of uncertainty, racing and intrusive thoughts, and irritability.  These symptoms are making it difficult for patient to function effectively.    Diagnosis:   Anxiety disorders; generalized anxiety disorder [F41.1]    Treatment Goals and Plan: Pt plans to continue CBT, Problem-solving Therapy, and Solution-focused Therapy    Future treatment will utilize CBT, Problem-solving Therapy, and Solution-focused Therapy    Return to Clinic: 3 weeks    Plan to discuss case with [facility] consulting psychiatrist and further recommendations to be potentially be made at that time.  Refer to psychiatry    Length of Appointment:  33 minutes spent face-to-face and 13 minutes spent in non face-to-face clinical care.

## 2023-10-03 ENCOUNTER — TELEPHONE (OUTPATIENT)
Dept: BEHAVIORAL HEALTH | Facility: CLINIC | Age: 43
End: 2023-10-03
Payer: COMMERCIAL

## 2023-10-03 NOTE — PROGRESS NOTES
CHW reached out to pt to remind him of virtual appointment with Aleksey Heller LPC tomorrow. Pt confirmed the appointment.

## 2023-10-04 ENCOUNTER — PATIENT MESSAGE (OUTPATIENT)
Dept: BEHAVIORAL HEALTH | Facility: CLINIC | Age: 43
End: 2023-10-04

## 2023-10-04 ENCOUNTER — CLINICAL SUPPORT (OUTPATIENT)
Dept: BEHAVIORAL HEALTH | Facility: CLINIC | Age: 43
End: 2023-10-04
Payer: COMMERCIAL

## 2023-10-04 DIAGNOSIS — F41.1 GENERALIZED ANXIETY DISORDER: Primary | ICD-10-CM

## 2023-10-04 PROCEDURE — 90832 PSYTX W PT 30 MINUTES: CPT | Mod: 95,,, | Performed by: COUNSELOR

## 2023-10-04 PROCEDURE — 90832 PR PSYCHOTHERAPY W/PATIENT, 30 MIN: ICD-10-PCS | Mod: 95,,, | Performed by: COUNSELOR

## 2023-10-27 ENCOUNTER — TELEPHONE (OUTPATIENT)
Dept: BEHAVIORAL HEALTH | Facility: CLINIC | Age: 43
End: 2023-10-27
Payer: COMMERCIAL

## 2023-10-27 ENCOUNTER — PATIENT MESSAGE (OUTPATIENT)
Dept: BEHAVIORAL HEALTH | Facility: CLINIC | Age: 43
End: 2023-10-27
Payer: COMMERCIAL

## 2023-10-27 NOTE — PROGRESS NOTES
Primary Care Behavioral Health Integration: Follow-up  Date:  10/30/2023  Patient Name: Harsha García  Referral Source:  Barbie Velazquez MD  Type of Visit:  Video Session  Site:  [facility]    Visit type: Virtual visit with synchronous audio and video  The patient location is:  [place] - [address] [place] [address]  [id number]  The patient location [place] is:  [facility]  The patient phone number is: [phone]    Each patient to whom he or she provides medical services by telemedicine is:  (1) informed of the relationship between the physician and patient and the respective role of any other health care provider with respect to management of the patient; and (2) notified that he or she may decline to receive medical services by telemedicine and may withdraw from such care at any time.    History of Present Illness:  Harsha García, a 43 y.o.  male with history of Anxiety disorders; generalized anxiety disorder [F41.1] referred by Barbie Velazquez MD.  Patient was seen, examined and chart was reviewed.    Met with patient.       Patient began this session by stating he feels stuck in his current job.  Reported he has not found any other job positions that are appealing to him because of the ones he has found, they involve the same line of work he is currently doing.  Noted he and his daughter were involved in a verbal altercation this morning.  Stated daughter has been consistently late for school, and she has been doing poorly academically.  Acknowledged daughter's behavior and attitude have him extremely frustrated.  LPC and patient discussed a possible medication management session with Dr. Loren Miranda to determine if Adderall 15 mg and Abilify 30 mg need to be adjusted or whether medication changes are necessary.  Patient stated he his is concerned about the intensity and frequency of his anger outbursts, particularly as this relates to interacting with his daughter.  Stated he would be open  to meeting with Dr. Miranda for a one-time consultation.   LPC and patient reviewed his completed Tool 7 - Challenging Distorted Thoughts worksheet of the CBT model.  Patient listed the distorted thought of his daughter not listening to him.  He challenged the distorted thought with the statement of at least giving her an opportunity so she can comply.  He listed another distorted thought as continually making mistakes on assignments he needs to complete for work.  Challenged this distorted thought stating he has done the work before so there is really no reason for him to believe that he will make mistakes.  LPC sent patient the Tool 8 - Downward Arrow To Identify Core Belief worksheet of the CBT Toolbox workbook.  Will review patient's completed worksheet during follow-up appointment on November 16, 2023.              10/4/2023    11:39 AM 9/18/2023     9:35 AM 8/29/2023     4:16 PM 8/15/2023    11:58 AM 7/31/2023     9:53 AM 7/3/2023     1:20 PM 6/27/2023     1:11 PM   Results of the PHQ8   Little interest or pleasure in doing things Several days More than half the days More than half the days More than half the days Several days More than half the days More than half the days   Feeling down, depressed, or hopeless More than half the days Nearly every day More than half the days Nearly every day Several days Nearly every day Nearly every day   Trouble falling or staying asleep, or sleeping too much More than half the days Several days More than half the days Not at all Several days Nearly every day Nearly every day   Feeling tired or having little energy Nearly every day Nearly every day Nearly every day More than half the days More than half the days Nearly every day Nearly every day   Poor appetite or overeating Not at all Not at all Not at all Not at all Not at all Not at all Not at all   Feeling bad about yourself - or that you are a failure or have let yourself or your family down Nearly every day Nearly  every day Nearly every day Nearly every day Nearly every day Nearly every day More than half the days   Trouble concentrating on things, such as reading the newspaper or watching television Several days More than half the days Several days More than half the days Several days Not at all Several days   Moving or speaking so slowly that other people could have noticed. Or the opposite - being so fidgety or restless that you have been moving around a lot more than usual Not at all Not at all Not at all Not at all Not at all Not at all Not at all   Total Score  12 14 13 12 9 14 14           10/4/2023    11:39 AM 9/18/2023     9:34 AM 8/29/2023     4:15 PM   GAD7   1. Feeling nervous, anxious, or on edge? 1 2 2   2. Not being able to stop or control worrying? 1 2 1   3. Worrying too much about different things? 2 2 1   4. Trouble relaxing? 1 1 2   5. Being so restless that it is hard to sit still? 0 1 1   6. Becoming easily annoyed or irritable? 1 2 2   7. Feeling afraid as if something awful might happen? 0 1 0   CRIS-7 Score 6 11 9       Mental Status Exam  General Appearance:  unremarkable, age appropriate     Speech: normal tone, normal rate, normal pitch, normal volume         Level of Cooperation: cooperative        Thought Processes: normal and logical      Mood: anxious        Thought Content: normal, no suicidality, no homicidality, delusions, or paranoia     Affect: congruent and appropriate, anxious    Orientation: Oriented x3     Memory: recent >  intact, remote >  intact     Attention Span & Concentration: intact     Fund of General Knowledge: intact and appropriate to age and level of education   Abstract Reasoning: interpretation of similarities was concrete   Judgment & Insight: good       Language:  intact       Treatment plan:  Target symptoms: anxiety , work stress  Why chosen therapy is appropriate versus another modality: relevant to diagnosis  Outcome monitoring methods: self-report  Therapeutic  intervention type: insight oriented psychotherapy, behavior modifying psychotherapy, supportive psychotherapy    Risk parameters:  Patient reports no suicidal ideation  Patient reports no homicidal ideation  Patient reports no self-injurious behavior  Patient reports no violent behavior    Verbal deficits: None    Patient's response to intervention:  The patient's response to intervention is accepting.    Progress toward goals and other mental status changes:  The patient's progress toward goals is fair .    Patient advised to call [phone] or present the the nearest ED if they experience suicidal or homicidal ideation, plan or intent.       Impression:  My diagnostic impression is patient continues to experience excessive worrying, difficulty relaxing, difficulty concentrating, and feeling on edge as evidenced by fear of uncertainty, racing and intrusive thoughts, and irritability.  These symptoms are making it difficult for patient to function effectively.      Diagnosis:   Anxiety disorders; generalized anxiety disorder [F41.1]    Treatment Goals and Plan: Pt plans to continue CBT, Problem-solving Therapy, and Solution-focused Therapy    Future treatment will utilize CBT, Problem-solving Therapy, and Solution-focused Therapy    Return to Clinic: 2 weeks    Plan to discuss case with [facility] consulting psychiatrist and further recommendations to be potentially be made at that time.  Refer to psychiatry    Length of Appointment:  24 minutes spent face-to-face and 13  minutes spent in non face-to-face clinical care.

## 2023-10-27 NOTE — PROGRESS NOTES
CHW reached out to pt to remind him of virtual appointment with Aleksey Heller LPC Monday. No answer, mailbox is full unable to LVM. Sent patient portal appointment reminder.

## 2023-10-30 ENCOUNTER — CLINICAL SUPPORT (OUTPATIENT)
Dept: BEHAVIORAL HEALTH | Facility: CLINIC | Age: 43
End: 2023-10-30
Payer: COMMERCIAL

## 2023-10-30 ENCOUNTER — PATIENT MESSAGE (OUTPATIENT)
Dept: BEHAVIORAL HEALTH | Facility: CLINIC | Age: 43
End: 2023-10-30

## 2023-10-30 DIAGNOSIS — F41.1 GENERALIZED ANXIETY DISORDER: Primary | ICD-10-CM

## 2023-10-30 PROCEDURE — 90832 PSYTX W PT 30 MINUTES: CPT | Mod: 95,,, | Performed by: COUNSELOR

## 2023-10-30 PROCEDURE — 90832 PR PSYCHOTHERAPY W/PATIENT, 30 MIN: ICD-10-PCS | Mod: 95,,, | Performed by: COUNSELOR

## 2023-11-02 NOTE — PROGRESS NOTES
Patient discussed during [facility] meeting today, 11/02/2023.  Patient with poorly controlled anxiety, anger, and ADHD.  He is currently taking Abilify 30mg daily and Adderall XR 15mg 2 tablets daily.  He is followed by Dr. Gi Coppola.  I would recommend he discuss potential medication changes with his psychiatrist.      Time: 10 minutes    The above treatment considerations and suggestions are based on consultations with the patients Behavioral Health Integration therapist and a review of information available in the patient's chart.  I have not personally examined the patient.  All recommendations should be implemented with consideration of the patients relevant prior history and current clinical status.  It remains the responsibility of the patient's Primary Care Physician to prescribe medications and to educate the patient on risks versus benefits, alternative treatments, side effect profile and the inherent unpredictability of individual responses to medications.  Please feel free to call me with any questions about the care of this patient.

## 2023-11-15 ENCOUNTER — TELEPHONE (OUTPATIENT)
Dept: BEHAVIORAL HEALTH | Facility: CLINIC | Age: 43
End: 2023-11-15
Payer: COMMERCIAL

## 2023-11-15 ENCOUNTER — PATIENT MESSAGE (OUTPATIENT)
Dept: BEHAVIORAL HEALTH | Facility: CLINIC | Age: 43
End: 2023-11-15
Payer: COMMERCIAL

## 2023-11-15 NOTE — PROGRESS NOTES
CHW reached out to pt to remind him of virtual appointment with Aleksey Heller LPC tomorrow. No answer, LVM. Sent portal reminder of the appointment.

## 2023-11-15 NOTE — PROGRESS NOTES
"Primary Care Behavioral Health Integration: Follow-up  Date:  11/16/2023  Patient Name: Harsha García  Referral Source:  Barbie Velazquez MD  Type of Visit:  Video Session  Site:  [facility]    Visit type: Virtual visit with synchronous audio and video  The patient location is:  [place] [address]  The patient location [place] is:  [facility]  The patient phone number is: [phone]    Each patient to whom he or she provides medical services by telemedicine is:  (1) informed of the relationship between the physician and patient and the respective role of any other health care provider with respect to management of the patient; and (2) notified that he or she may decline to receive medical services by telemedicine and may withdraw from such care at any time.    History of Present Illness:  Harsha García, a 43 y.o.  male with history of Anxiety disorders; generalized anxiety disorder [F41.1] referred by Barbie Velazquez MD.  Patient was seen, examined and chart was reviewed.    Met with patient.       Patient began this session by stating his next appointment with his psychiatrist is sometime in January 2024.  Stated he plans on discussing the rationale of prescribing him Adderall 15 mg and Abilify 30 mg.  Reported he will discuss options of either decreasing Abilify 30 mg or replacing it altogether.  LPC and patient discussed the fact that his ability to control his anger and his overreaction toward his daughter have not improved with time.  Discussed the possibility of scheduling an appointment for a family therapy session with patient, his wife, and his daughter with an outside therapist for longer-term therapy.  According to patient, daughter continues to have difficulty in school, and she has not improved academically.  Patient stated he believes daughter is in danger of failing all of her courses with the exception of one.  He noted work is "about the same," and he " "claims a lot of deadlines are approaching and he is still not caught up in his work.  Noted his biggest concern with scheduling a family therapy session is one of time.  Patient requested LPC speak with his psychiatrist on his progress during therapy.   [facility] will need to have patient sign a Release of Information Form before that discussion occurs.  We reviewed his completed Tool 8 - Downward Arrow to Identify Core Belief worksheet of the CBT Toolbox Workbook.  Patient began with the distorted thought that, "Jasper will never be able to complete her work," and concluded with the maladaptive core belief, "Jasper is a complete failure."  We discussed the inherent problems with allowing any distorted thinking pattern to produce a maladaptive core belief.  Patient will follow up with RODNEY on December 14, 2023              10/30/2023    10:51 AM 10/4/2023    11:39 AM 9/18/2023     9:35 AM 8/29/2023     4:16 PM 8/15/2023    11:58 AM 7/31/2023     9:53 AM 7/3/2023     1:20 PM   Results of the PHQ8   Little interest or pleasure in doing things More than half the days Several days More than half the days More than half the days More than half the days Several days More than half the days   Feeling down, depressed, or hopeless Nearly every day More than half the days Nearly every day More than half the days Nearly every day Several days Nearly every day   Trouble falling or staying asleep, or sleeping too much More than half the days More than half the days Several days More than half the days Not at all Several days Nearly every day   Feeling tired or having little energy Nearly every day Nearly every day Nearly every day Nearly every day More than half the days More than half the days Nearly every day   Poor appetite or overeating Not at all Not at all Not at all Not at all Not at all Not at all Not at all   Feeling bad about yourself - or that you are a failure or have let yourself or your family down Nearly every day Nearly " every day Nearly every day Nearly every day Nearly every day Nearly every day Nearly every day   Trouble concentrating on things, such as reading the newspaper or watching television More than half the days Several days More than half the days Several days More than half the days Several days Not at all   Moving or speaking so slowly that other people could have noticed. Or the opposite - being so fidgety or restless that you have been moving around a lot more than usual Not at all Not at all Not at all Not at all Not at all Not at all Not at all   Total Score  15 12 14 13 12 9 14           10/30/2023    10:51 AM 10/4/2023    11:39 AM 9/18/2023     9:34 AM   GAD7   1. Feeling nervous, anxious, or on edge? 2 1 2   2. Not being able to stop or control worrying? 2 1 2   3. Worrying too much about different things? 2 2 2   4. Trouble relaxing? 2 1 1   5. Being so restless that it is hard to sit still? 1 0 1   6. Becoming easily annoyed or irritable? 3 1 2   7. Feeling afraid as if something awful might happen? 1 0 1   CRIS-7 Score 13 6 11       Mental Status Exam  General Appearance:  unremarkable, age appropriate     Speech: normal tone, normal rate, normal pitch, normal volume         Level of Cooperation: cooperative        Thought Processes: normal and logical      Mood: anxious        Thought Content: normal, no suicidality, no homicidality, delusions, or paranoia     Affect: anxious    Orientation: Oriented x3     Memory: recent >  intact, remote >  intact     Attention Span & Concentration: intact     Fund of General Knowledge: intact and appropriate to age and level of education   Abstract Reasoning: interpretation of similarities was concrete   Judgment & Insight: fair       Language:  intact       Treatment plan:  Target symptoms: anxiety , work stress  Why chosen therapy is appropriate versus another modality: relevant to diagnosis  Outcome monitoring methods: self-report  Therapeutic intervention type:  insight oriented psychotherapy, behavior modifying psychotherapy, supportive psychotherapy    Risk parameters:  Patient reports no suicidal ideation  Patient reports no homicidal ideation  Patient reports no self-injurious behavior  Patient reports no violent behavior    Verbal deficits: None    Patient's response to intervention:  The patient's response to intervention is accepting.    Progress toward goals and other mental status changes:  The patient's progress toward goals is limited.    Patient advised to call [phone] or present the the nearest ED if they experience suicidal or homicidal ideation, plan or intent.       Impression:  My diagnostic impression is patient continues to experience excessive worrying, difficulty relaxing, difficulty concentrating, and feeling on edge as evidenced by fear of uncertainty, racing and intrusive thoughts, and irritability.  These symptoms are making it difficult for patient to function effectively.        Diagnosis:   Anxiety disorders; generalized anxiety disorder [F41.1]    Treatment Goals and Plan: Pt plans to continue CBT, Problem-solving Therapy, and Solution-focused Therapy    Future treatment will utilize CBT, Problem-solving Therapy, and Solution-focused Therapy    Return to Clinic: 3 weeks    Plan to discuss case with [facility] consulting psychiatrist and further recommendations to be potentially be made at that time.  Refer to psychiatry    Length of Appointment:  27 minutes spent face-to-face and 13 minutes spent in non face-to-face clinical care.

## 2023-11-16 ENCOUNTER — CLINICAL SUPPORT (OUTPATIENT)
Dept: BEHAVIORAL HEALTH | Facility: CLINIC | Age: 43
End: 2023-11-16
Payer: COMMERCIAL

## 2023-11-16 DIAGNOSIS — F41.1 GENERALIZED ANXIETY DISORDER: Primary | ICD-10-CM

## 2023-11-16 PROCEDURE — 90832 PSYTX W PT 30 MINUTES: CPT | Mod: 95,,, | Performed by: COUNSELOR

## 2023-11-16 PROCEDURE — 90832 PR PSYCHOTHERAPY W/PATIENT, 30 MIN: ICD-10-PCS | Mod: 95,,, | Performed by: COUNSELOR

## 2023-11-27 ENCOUNTER — TELEPHONE (OUTPATIENT)
Dept: BEHAVIORAL HEALTH | Facility: CLINIC | Age: 43
End: 2023-11-27
Payer: COMMERCIAL

## 2023-11-27 NOTE — PROGRESS NOTES
CHW returned missed call from pt. Pt has a confidential question for Aleksey Heller LPC and would like to be contacted by him. Forwarded patient request to provider to pls call pt at [phone].

## 2023-11-27 NOTE — TELEPHONE ENCOUNTER
November 27, 2023 - 4:05 p.m.    - [facility] left a voicemail with patient to attempt to discuss a confidential matter per CHW, Malini Erickson.        4:31 p.m. - Patient contacted [facility] to inform LPC he will be meeting with his psychiatrist on November 28, 2023 to discuss his Abilify dose (currently Abilify 30 mg.) and whether that dosage needs to be adjusted or if he needs to be weaned off of Abilify and prescribed a different psychiatric medication.  [facility] also encouraged patient to discuss whether Adderall 15 mg needs to be adjusted or left as is.  Patient stated he will make a determination whether he would like [facility] to have a conversation with his psychiatrist on the nature and progress of current therapy sessions with LPC.     - Aleksey Heller LPC, CTTS

## 2023-12-13 ENCOUNTER — TELEPHONE (OUTPATIENT)
Dept: BEHAVIORAL HEALTH | Facility: CLINIC | Age: 43
End: 2023-12-13
Payer: COMMERCIAL

## 2023-12-14 ENCOUNTER — TELEPHONE (OUTPATIENT)
Dept: BEHAVIORAL HEALTH | Facility: CLINIC | Age: 43
End: 2023-12-14
Payer: COMMERCIAL

## 2023-12-14 NOTE — PROGRESS NOTES
CHW reached out to pt to reschedule virtual appointment today since Aleksey Heller, LPC is out sick today. Rescheduled virtual follow-up appointment on Monday, 12/18/2023 at 12:30pm.

## 2023-12-15 ENCOUNTER — PATIENT MESSAGE (OUTPATIENT)
Dept: BEHAVIORAL HEALTH | Facility: CLINIC | Age: 43
End: 2023-12-15
Payer: COMMERCIAL

## 2023-12-15 ENCOUNTER — TELEPHONE (OUTPATIENT)
Dept: BEHAVIORAL HEALTH | Facility: CLINIC | Age: 43
End: 2023-12-15
Payer: COMMERCIAL

## 2023-12-15 NOTE — PROGRESS NOTES
CHW reached out to pt to remind him of virtual appointment with Aleksey Heller LPC Monday. No answer, LVM. Sent portal message reminder of the appointment.

## 2023-12-18 ENCOUNTER — CLINICAL SUPPORT (OUTPATIENT)
Dept: BEHAVIORAL HEALTH | Facility: CLINIC | Age: 43
End: 2023-12-18
Payer: COMMERCIAL

## 2023-12-18 DIAGNOSIS — F33.1 MAJOR DEPRESSIVE DISORDER, RECURRENT, MODERATE: ICD-10-CM

## 2023-12-18 DIAGNOSIS — F41.1 GENERALIZED ANXIETY DISORDER: Primary | ICD-10-CM

## 2023-12-18 PROCEDURE — 90832 PSYTX W PT 30 MINUTES: CPT | Mod: 95,,, | Performed by: COUNSELOR

## 2023-12-18 PROCEDURE — 90832 PR PSYCHOTHERAPY W/PATIENT, 30 MIN: ICD-10-PCS | Mod: 95,,, | Performed by: COUNSELOR

## 2024-02-08 ENCOUNTER — TELEPHONE (OUTPATIENT)
Dept: BEHAVIORAL HEALTH | Facility: CLINIC | Age: 44
End: 2024-02-08
Payer: COMMERCIAL

## 2024-02-08 NOTE — PROGRESS NOTES
[facility] appointment with Aleksey Heller LPC tomorrow. Pt confirmed the appointment.

## 2024-02-16 ENCOUNTER — TELEPHONE (OUTPATIENT)
Dept: BEHAVIORAL HEALTH | Facility: CLINIC | Age: 44
End: 2024-02-16
Payer: COMMERCIAL

## 2024-02-16 ENCOUNTER — PATIENT MESSAGE (OUTPATIENT)
Dept: BEHAVIORAL HEALTH | Facility: CLINIC | Age: 44
End: 2024-02-16
Payer: COMMERCIAL

## 2024-02-16 NOTE — PROGRESS NOTES
CHW reached out to pt to reschedule virtual follow-up appointment with Aleksey Heller LPC. No answer, LVM. Sent portal message with upcoming appts to reschedule appt.

## 2024-03-01 ENCOUNTER — TELEPHONE (OUTPATIENT)
Dept: BEHAVIORAL HEALTH | Facility: CLINIC | Age: 44
End: 2024-03-01
Payer: COMMERCIAL

## 2024-03-01 ENCOUNTER — PATIENT MESSAGE (OUTPATIENT)
Dept: BEHAVIORAL HEALTH | Facility: CLINIC | Age: 44
End: 2024-03-01
Payer: COMMERCIAL

## 2024-03-01 NOTE — PROGRESS NOTES
Contacted patient to offer to schedule a follow-up appointment, no answer left VM. Sent portal message with available appt, will close EOC if no response on 3/8/24.

## 2024-10-11 ENCOUNTER — HOSPITAL ENCOUNTER (EMERGENCY)
Facility: HOSPITAL | Age: 44
Discharge: HOME | End: 2024-10-11
Attending: EMERGENCY MEDICINE
Payer: COMMERCIAL

## 2024-10-11 VITALS
HEIGHT: 70 IN | OXYGEN SATURATION: 100 % | TEMPERATURE: 98 F | WEIGHT: 192 LBS | HEART RATE: 82 BPM | SYSTOLIC BLOOD PRESSURE: 125 MMHG | BODY MASS INDEX: 27.49 KG/M2 | RESPIRATION RATE: 14 BRPM | DIASTOLIC BLOOD PRESSURE: 79 MMHG

## 2024-10-11 DIAGNOSIS — M54.9 ACUTE MIDLINE BACK PAIN, UNSPECIFIED BACK LOCATION: Primary | ICD-10-CM

## 2024-10-11 LAB
ANION GAP SERPL CALC-SCNC: 10 MMOL/L (ref 8–16)
BASOPHILS # BLD AUTO: 0.02 K/UL (ref 0–0.2)
BASOPHILS NFR BLD: 0.2 % (ref 0–1.9)
BUN SERPL-MCNC: 11 MG/DL (ref 6–20)
CALCIUM SERPL-MCNC: 9.4 MG/DL (ref 8.7–10.5)
CHLORIDE SERPL-SCNC: 108 MMOL/L (ref 95–110)
CO2 SERPL-SCNC: 23 MMOL/L (ref 23–29)
CREAT SERPL-MCNC: 1.2 MG/DL (ref 0.5–1.4)
DIFFERENTIAL METHOD BLD: ABNORMAL
EOSINOPHIL # BLD AUTO: 0 K/UL (ref 0–0.5)
EOSINOPHIL NFR BLD: 0.1 % (ref 0–8)
ERYTHROCYTE [DISTWIDTH] IN BLOOD BY AUTOMATED COUNT: 11.9 % (ref 11.5–14.5)
EST. GFR  (NO RACE VARIABLE): >60 ML/MIN/1.73 M^2
GLUCOSE SERPL-MCNC: 116 MG/DL (ref 70–110)
HCT VFR BLD AUTO: 46.7 % (ref 40–54)
HCV AB SERPL QL IA: NORMAL
HGB BLD-MCNC: 15.8 G/DL (ref 14–18)
HIV 1+2 AB+HIV1 P24 AG SERPL QL IA: NORMAL
IMM GRANULOCYTES # BLD AUTO: 0.02 K/UL (ref 0–0.04)
IMM GRANULOCYTES NFR BLD AUTO: 0.2 % (ref 0–0.5)
LYMPHOCYTES # BLD AUTO: 1.1 K/UL (ref 1–4.8)
LYMPHOCYTES NFR BLD: 9.3 % (ref 18–48)
MCH RBC QN AUTO: 31.3 PG (ref 27–31)
MCHC RBC AUTO-ENTMCNC: 33.8 G/DL (ref 32–36)
MCV RBC AUTO: 93 FL (ref 82–98)
MONOCYTES # BLD AUTO: 0.3 K/UL (ref 0.3–1)
MONOCYTES NFR BLD: 2.9 % (ref 4–15)
NEUTROPHILS # BLD AUTO: 10 K/UL (ref 1.8–7.7)
NEUTROPHILS NFR BLD: 87.3 % (ref 38–73)
NRBC BLD-RTO: 0 /100 WBC
PLATELET # BLD AUTO: 235 K/UL (ref 150–450)
PMV BLD AUTO: 9.5 FL (ref 9.2–12.9)
POTASSIUM SERPL-SCNC: 4.5 MMOL/L (ref 3.5–5.1)
RBC # BLD AUTO: 5.05 M/UL (ref 4.6–6.2)
SODIUM SERPL-SCNC: 141 MMOL/L (ref 136–145)
WBC # BLD AUTO: 11.44 K/UL (ref 3.9–12.7)

## 2024-10-11 PROCEDURE — 86803 HEPATITIS C AB TEST: CPT | Performed by: PHYSICIAN ASSISTANT

## 2024-10-11 PROCEDURE — 99284 EMERGENCY DEPT VISIT MOD MDM: CPT | Mod: 25

## 2024-10-11 PROCEDURE — 85025 COMPLETE CBC W/AUTO DIFF WBC: CPT | Performed by: EMERGENCY MEDICINE

## 2024-10-11 PROCEDURE — 96375 TX/PRO/DX INJ NEW DRUG ADDON: CPT

## 2024-10-11 PROCEDURE — 63600175 PHARM REV CODE 636 W HCPCS: Performed by: EMERGENCY MEDICINE

## 2024-10-11 PROCEDURE — 87389 HIV-1 AG W/HIV-1&-2 AB AG IA: CPT | Performed by: PHYSICIAN ASSISTANT

## 2024-10-11 PROCEDURE — 25000003 PHARM REV CODE 250: Performed by: EMERGENCY MEDICINE

## 2024-10-11 PROCEDURE — 96374 THER/PROPH/DIAG INJ IV PUSH: CPT

## 2024-10-11 PROCEDURE — 80048 BASIC METABOLIC PNL TOTAL CA: CPT | Performed by: EMERGENCY MEDICINE

## 2024-10-11 RX ORDER — KETOROLAC TROMETHAMINE 30 MG/ML
10 INJECTION, SOLUTION INTRAMUSCULAR; INTRAVENOUS
Status: COMPLETED | OUTPATIENT
Start: 2024-10-11 | End: 2024-10-11

## 2024-10-11 RX ORDER — HYDROMORPHONE HYDROCHLORIDE 1 MG/ML
1 INJECTION, SOLUTION INTRAMUSCULAR; INTRAVENOUS; SUBCUTANEOUS
Status: COMPLETED | OUTPATIENT
Start: 2024-10-11 | End: 2024-10-11

## 2024-10-11 RX ORDER — IBUPROFEN 400 MG/1
400 TABLET ORAL
Status: COMPLETED | OUTPATIENT
Start: 2024-10-11 | End: 2024-10-11

## 2024-10-11 RX ORDER — OXYCODONE HYDROCHLORIDE 5 MG/1
10 TABLET ORAL
Status: COMPLETED | OUTPATIENT
Start: 2024-10-11 | End: 2024-10-11

## 2024-10-11 RX ORDER — METHYLPREDNISOLONE 4 MG/1
TABLET ORAL
Qty: 1 EACH | Refills: 0 | Status: SHIPPED | OUTPATIENT
Start: 2024-10-11 | End: 2024-11-01

## 2024-10-11 RX ORDER — KETOROLAC TROMETHAMINE 30 MG/ML
30 INJECTION, SOLUTION INTRAMUSCULAR; INTRAVENOUS
Status: DISCONTINUED | OUTPATIENT
Start: 2024-10-11 | End: 2024-10-11

## 2024-10-11 RX ORDER — MORPHINE SULFATE 2 MG/ML
2 INJECTION, SOLUTION INTRAMUSCULAR; INTRAVENOUS
Status: DISCONTINUED | OUTPATIENT
Start: 2024-10-11 | End: 2024-10-11

## 2024-10-11 RX ORDER — ACETAMINOPHEN 500 MG
1000 TABLET ORAL
Status: COMPLETED | OUTPATIENT
Start: 2024-10-11 | End: 2024-10-11

## 2024-10-11 RX ADMIN — HYDROMORPHONE HYDROCHLORIDE 1 MG: 1 INJECTION, SOLUTION INTRAMUSCULAR; INTRAVENOUS; SUBCUTANEOUS at 06:10

## 2024-10-11 RX ADMIN — KETOROLAC TROMETHAMINE 10 MG: 30 INJECTION, SOLUTION INTRAMUSCULAR; INTRAVENOUS at 08:10

## 2024-10-11 RX ADMIN — ACETAMINOPHEN 1000 MG: 500 TABLET ORAL at 04:10

## 2024-10-11 RX ADMIN — OXYCODONE 10 MG: 5 TABLET ORAL at 04:10

## 2024-10-11 RX ADMIN — IBUPROFEN 400 MG: 400 TABLET ORAL at 04:10

## 2024-10-11 NOTE — FIRST PROVIDER EVALUATION
"Medical screening examination initiated.  I have conducted a focused provider triage encounter, findings are as follows:    Brief history of present illness:  Severe low back pain, sudden onset while bending over while walking dog    Vitals:    10/11/24 1431   BP: 110/66   Pulse: (!) 114   Resp: 18   Temp: 98.4 °F (36.9 °C)   TempSrc: Oral   SpO2: 99%   Weight: 87.1 kg (192 lb)   Height: 5' 10" (1.778 m)       Pertinent physical exam:  mildly uncomfortable appearing    Brief workup plan:  analgesia    Preliminary workup initiated; this workup will be continued and followed by the physician or advanced practice provider that is assigned to the patient when roomed.  "

## 2024-10-11 NOTE — ED PROVIDER NOTES
Encounter Date: 10/11/2024       History     Chief Complaint   Patient presents with    Back Pain     Patient arrived via ems for chief complaint of back pain. Patient states he bent down this morning and got a stabbing pain in his back and now endorses having severe pain and difficulty moving after incident. Patient given 50mcg of fentanyl in route, patient not endorsing 5/10 pain, after fentanyl.       44-year-old male, healthy, complaining of low back pain, severe.  Patient states that he was walking the dog this morning and he bent over to pick something up and developed severe pain in his lower back.  The pain was so severe he was having difficulty moving or walking.  The pain does not radiate into his legs at all.  It is localized to the lower midline of his back.  He had no weakness or numbness in his lower extremities, no difficulty controlling his bladder.  He has had no fevers or chills.  He had no falls.  EMS was called given the severity of his pain and they gave him fentanyl 50 mcg EN route patient reports improvement in his symptoms.    The history is provided by the patient.     Review of patient's allergies indicates:  No Known Allergies  Past Medical History:   Diagnosis Date    ADHD      Past Surgical History:   Procedure Laterality Date    LASIK Bilateral      Family History   Problem Relation Name Age of Onset    Hypertension Mother      Kidney failure Mother      Diabetes type I Brother       Social History     Tobacco Use    Smoking status: Never     Passive exposure: Never    Smokeless tobacco: Never     Review of Systems    Physical Exam     Initial Vitals [10/11/24 1431]   BP Pulse Resp Temp SpO2   110/66 (!) 114 18 98.4 °F (36.9 °C) 99 %      MAP       --         Physical Exam    Nursing note and vitals reviewed.  Constitutional: He appears well-developed and well-nourished. He is not diaphoretic. No distress.   HENT: Mouth/Throat: Oropharynx is clear and moist.   Eyes: Conjunctivae are  normal.   Neck: Neck supple.   Cardiovascular:  Normal rate.           Pulmonary/Chest: No respiratory distress.   Abdominal: Abdomen is soft. He exhibits no distension. There is no abdominal tenderness. There is no rebound and no guarding.   Musculoskeletal:         General: No edema.      Cervical back: Neck supple.      Comments: Bilateral straight leg raise test positive     Neurological: He is alert and oriented to person, place, and time.   Lower extremity strength is 5/5 bilaterally, EHL is 5/5, sensation is intact to light touch in lower extremities bilaterally.   Skin: Skin is warm and dry.   Psychiatric: He has a normal mood and affect. Thought content normal.         ED Course   Procedures  Labs Reviewed   CBC W/ AUTO DIFFERENTIAL - Abnormal       Result Value    WBC 11.44      RBC 5.05      Hemoglobin 15.8      Hematocrit 46.7      MCV 93      MCH 31.3 (*)     MCHC 33.8      RDW 11.9      Platelets 235      MPV 9.5      Immature Granulocytes 0.2      Gran # (ANC) 10.0 (*)     Immature Grans (Abs) 0.02      Lymph # 1.1      Mono # 0.3      Eos # 0.0      Baso # 0.02      nRBC 0      Gran % 87.3 (*)     Lymph % 9.3 (*)     Mono % 2.9 (*)     Eosinophil % 0.1      Basophil % 0.2      Differential Method Automated     BASIC METABOLIC PANEL - Abnormal    Sodium 141      Potassium 4.5      Chloride 108      CO2 23      Glucose 116 (*)     BUN 11      Creatinine 1.2      Calcium 9.4      Anion Gap 10      eGFR >60.0     HIV 1 / 2 ANTIBODY    HIV 1/2 Ag/Ab Non-reactive      Narrative:     Release to patient->Immediate   HEPATITIS C ANTIBODY    Hepatitis C Ab Non-reactive      Narrative:     Release to patient->Immediate          Imaging Results    None          Medications   oxyCODONE immediate release tablet 10 mg (10 mg Oral Given 10/11/24 1603)   ibuprofen tablet 400 mg (400 mg Oral Given 10/11/24 1604)   acetaminophen tablet 1,000 mg (1,000 mg Oral Given 10/11/24 1603)   HYDROmorphone injection 1 mg (1 mg  Intravenous Given 10/11/24 1819)   ketorolac injection 9.999 mg (9.999 mg Intravenous Given 10/11/24 2055)     Medical Decision Making  Back pain seems musculoskeletal in nature.  DDx would include muscle strain, herniated disc, sciatica.  There are currently no red flags present from H&P to suggest dangerous pathology like epidural abscess/discitis/osteomyelitis, spinal cord compression, cauda equina syndrome, fracture, malignancy or vascular emergency like AAA or aortic dissection and low suspicion for renal colic or pyelonephritis and therefore no indication at this time for emergent imaging.  On exam, I do not see a rash to suggest herpes zoster.    In the ED, I will give pain control.  Upon discharge, plan for supportive care, OTC pain meds and strict ED return precautions.  Outpatient f/u with PCP recommended.      Amount and/or Complexity of Data Reviewed  External Data Reviewed: notes.  Labs: ordered. Decision-making details documented in ED Course.    Risk  OTC drugs.  Prescription drug management.               ED Course as of 10/11/24 2224   Fri Oct 11, 2024   2020 Patient's lab work is unremarkable.  I have reassessed him and after multiple doses of pain medication, his pain has improved significantly, he is now able to stand up and ambulate around the ED without difficulty.  We had a long discussion about plan for pain management at home.  I am going to send him home with a Medrol Dosepak given the fact that the pain started with bending over, I would have a high suspicion for a disc herniation.  We discussed strict ED return precautions as well.  Patient verbalized comfort with this plan. [AS]      ED Course User Index  [AS] Yumiko Ridley MD                           Clinical Impression:  Final diagnoses:  [M54.9] Acute midline back pain, unspecified back location (Primary)          ED Disposition Condition    Discharge Stable          ED Prescriptions       Medication Sig Dispense Start Date End  Date Auth. Provider    methylPREDNISolone (MEDROL DOSEPACK) 4 mg tablet Use as directed 1 each 10/11/2024 11/1/2024 Yumiko Ridley MD          Follow-up Information       Follow up With Specialties Details Why Contact Info    [facility] Emergency Medicine  If symptoms worsen [address]  [facility] [id number]  [phone]             Yumiko Ridley MD  10/11/24 7142

## 2024-10-12 NOTE — ED TRIAGE NOTES
Harsha García, a 44 y.o. male presents to the ED w/ complaint of lower back pain.     Triage note:  Chief Complaint   Patient presents with    Back Pain     Patient arrived via ems for chief complaint of back pain. Patient states he bent down this morning and got a stabbing pain in his back and now endorses having severe pain and difficulty moving after incident. Patient given 50mcg of fentanyl in route, patient not endorsing 5/10 pain, after fentanyl.       Review of patient's allergies indicates:  No Known Allergies  Past Medical History:   Diagnosis Date    ADHD

## 2025-03-20 ENCOUNTER — OFFICE VISIT (OUTPATIENT)
Dept: PRIMARY CARE CLINIC | Facility: CLINIC | Age: 45
End: 2025-03-20
Payer: COMMERCIAL

## 2025-03-20 ENCOUNTER — LAB VISIT (OUTPATIENT)
Dept: LAB | Facility: HOSPITAL | Age: 45
End: 2025-03-20
Attending: STUDENT IN AN ORGANIZED HEALTH CARE EDUCATION/TRAINING PROGRAM
Payer: COMMERCIAL

## 2025-03-20 VITALS
HEART RATE: 88 BPM | HEIGHT: 70 IN | OXYGEN SATURATION: 98 % | WEIGHT: 192 LBS | SYSTOLIC BLOOD PRESSURE: 118 MMHG | BODY MASS INDEX: 27.49 KG/M2 | DIASTOLIC BLOOD PRESSURE: 82 MMHG

## 2025-03-20 DIAGNOSIS — F90.2 ATTENTION DEFICIT HYPERACTIVITY DISORDER (ADHD), COMBINED TYPE: ICD-10-CM

## 2025-03-20 DIAGNOSIS — F41.1 GAD (GENERALIZED ANXIETY DISORDER): ICD-10-CM

## 2025-03-20 DIAGNOSIS — Z12.12 ENCOUNTER FOR COLORECTAL CANCER SCREENING: ICD-10-CM

## 2025-03-20 DIAGNOSIS — Z12.11 ENCOUNTER FOR COLORECTAL CANCER SCREENING: ICD-10-CM

## 2025-03-20 DIAGNOSIS — Z00.00 ANNUAL PHYSICAL EXAM: ICD-10-CM

## 2025-03-20 DIAGNOSIS — F33.0 MILD EPISODE OF RECURRENT MAJOR DEPRESSIVE DISORDER: ICD-10-CM

## 2025-03-20 DIAGNOSIS — Z00.00 ANNUAL PHYSICAL EXAM: Primary | ICD-10-CM

## 2025-03-20 LAB
ALBUMIN SERPL BCP-MCNC: 4.5 G/DL (ref 3.5–5.2)
ALP SERPL-CCNC: 56 U/L (ref 40–150)
ALT SERPL W/O P-5'-P-CCNC: 33 U/L (ref 10–44)
ANION GAP SERPL CALC-SCNC: 12 MMOL/L (ref 8–16)
AST SERPL-CCNC: 20 U/L (ref 10–40)
BASOPHILS # BLD AUTO: 0.05 K/UL (ref 0–0.2)
BASOPHILS NFR BLD: 0.6 % (ref 0–1.9)
BILIRUB SERPL-MCNC: 0.4 MG/DL (ref 0.1–1)
BUN SERPL-MCNC: 13 MG/DL (ref 6–20)
CALCIUM SERPL-MCNC: 9.6 MG/DL (ref 8.7–10.5)
CHLORIDE SERPL-SCNC: 104 MMOL/L (ref 95–110)
CHOLEST SERPL-MCNC: 173 MG/DL (ref 120–199)
CHOLEST/HDLC SERPL: 3.8 {RATIO} (ref 2–5)
CO2 SERPL-SCNC: 24 MMOL/L (ref 23–29)
CREAT SERPL-MCNC: 1.2 MG/DL (ref 0.5–1.4)
DIFFERENTIAL METHOD BLD: NORMAL
EOSINOPHIL # BLD AUTO: 0.3 K/UL (ref 0–0.5)
EOSINOPHIL NFR BLD: 3.7 % (ref 0–8)
ERYTHROCYTE [DISTWIDTH] IN BLOOD BY AUTOMATED COUNT: 12.1 % (ref 11.5–14.5)
EST. GFR  (NO RACE VARIABLE): >60 ML/MIN/1.73 M^2
ESTIMATED AVG GLUCOSE: 111 MG/DL (ref 68–131)
GLUCOSE SERPL-MCNC: 94 MG/DL (ref 70–110)
HBA1C MFR BLD: 5.5 % (ref 4–5.6)
HCT VFR BLD AUTO: 47.5 % (ref 40–54)
HDLC SERPL-MCNC: 46 MG/DL (ref 40–75)
HDLC SERPL: 26.6 % (ref 20–50)
HGB BLD-MCNC: 15.5 G/DL (ref 14–18)
IMM GRANULOCYTES # BLD AUTO: 0.02 K/UL (ref 0–0.04)
IMM GRANULOCYTES NFR BLD AUTO: 0.2 % (ref 0–0.5)
LDLC SERPL CALC-MCNC: 107.6 MG/DL (ref 63–159)
LITHIUM SERPL-SCNC: 0.4 MMOL/L (ref 0.6–1.2)
LYMPHOCYTES # BLD AUTO: 2.5 K/UL (ref 1–4.8)
LYMPHOCYTES NFR BLD: 30.5 % (ref 18–48)
MCH RBC QN AUTO: 31 PG (ref 27–31)
MCHC RBC AUTO-ENTMCNC: 32.6 G/DL (ref 32–36)
MCV RBC AUTO: 95 FL (ref 82–98)
MONOCYTES # BLD AUTO: 0.8 K/UL (ref 0.3–1)
MONOCYTES NFR BLD: 9.9 % (ref 4–15)
NEUTROPHILS # BLD AUTO: 4.6 K/UL (ref 1.8–7.7)
NEUTROPHILS NFR BLD: 55.1 % (ref 38–73)
NONHDLC SERPL-MCNC: 127 MG/DL
NRBC BLD-RTO: 0 /100 WBC
PLATELET # BLD AUTO: 259 K/UL (ref 150–450)
PMV BLD AUTO: 9.9 FL (ref 9.2–12.9)
POTASSIUM SERPL-SCNC: 4.6 MMOL/L (ref 3.5–5.1)
PROT SERPL-MCNC: 7.7 G/DL (ref 6–8.4)
RBC # BLD AUTO: 5 M/UL (ref 4.6–6.2)
SODIUM SERPL-SCNC: 140 MMOL/L (ref 136–145)
TRIGL SERPL-MCNC: 97 MG/DL (ref 30–150)
TSH SERPL DL<=0.005 MIU/L-ACNC: 2.91 UIU/ML (ref 0.4–4)
WBC # BLD AUTO: 8.27 K/UL (ref 3.9–12.7)

## 2025-03-20 PROCEDURE — 80053 COMPREHEN METABOLIC PANEL: CPT | Performed by: STUDENT IN AN ORGANIZED HEALTH CARE EDUCATION/TRAINING PROGRAM

## 2025-03-20 PROCEDURE — 3008F BODY MASS INDEX DOCD: CPT | Mod: CPTII,S$GLB,, | Performed by: STUDENT IN AN ORGANIZED HEALTH CARE EDUCATION/TRAINING PROGRAM

## 2025-03-20 PROCEDURE — 1159F MED LIST DOCD IN RCRD: CPT | Mod: CPTII,S$GLB,, | Performed by: STUDENT IN AN ORGANIZED HEALTH CARE EDUCATION/TRAINING PROGRAM

## 2025-03-20 PROCEDURE — 85025 COMPLETE CBC W/AUTO DIFF WBC: CPT | Performed by: STUDENT IN AN ORGANIZED HEALTH CARE EDUCATION/TRAINING PROGRAM

## 2025-03-20 PROCEDURE — 99999 PR PBB SHADOW E&M-EST. PATIENT-LVL IV: CPT | Mod: PBBFAC,,, | Performed by: STUDENT IN AN ORGANIZED HEALTH CARE EDUCATION/TRAINING PROGRAM

## 2025-03-20 PROCEDURE — 1160F RVW MEDS BY RX/DR IN RCRD: CPT | Mod: CPTII,S$GLB,, | Performed by: STUDENT IN AN ORGANIZED HEALTH CARE EDUCATION/TRAINING PROGRAM

## 2025-03-20 PROCEDURE — 83036 HEMOGLOBIN GLYCOSYLATED A1C: CPT | Performed by: STUDENT IN AN ORGANIZED HEALTH CARE EDUCATION/TRAINING PROGRAM

## 2025-03-20 PROCEDURE — 36415 COLL VENOUS BLD VENIPUNCTURE: CPT | Mod: PN | Performed by: STUDENT IN AN ORGANIZED HEALTH CARE EDUCATION/TRAINING PROGRAM

## 2025-03-20 PROCEDURE — 3074F SYST BP LT 130 MM HG: CPT | Mod: CPTII,S$GLB,, | Performed by: STUDENT IN AN ORGANIZED HEALTH CARE EDUCATION/TRAINING PROGRAM

## 2025-03-20 PROCEDURE — 3079F DIAST BP 80-89 MM HG: CPT | Mod: CPTII,S$GLB,, | Performed by: STUDENT IN AN ORGANIZED HEALTH CARE EDUCATION/TRAINING PROGRAM

## 2025-03-20 PROCEDURE — 80061 LIPID PANEL: CPT | Performed by: STUDENT IN AN ORGANIZED HEALTH CARE EDUCATION/TRAINING PROGRAM

## 2025-03-20 PROCEDURE — 84443 ASSAY THYROID STIM HORMONE: CPT | Performed by: STUDENT IN AN ORGANIZED HEALTH CARE EDUCATION/TRAINING PROGRAM

## 2025-03-20 PROCEDURE — 80178 ASSAY OF LITHIUM: CPT | Performed by: STUDENT IN AN ORGANIZED HEALTH CARE EDUCATION/TRAINING PROGRAM

## 2025-03-20 PROCEDURE — 99396 PREV VISIT EST AGE 40-64: CPT | Mod: S$GLB,,, | Performed by: STUDENT IN AN ORGANIZED HEALTH CARE EDUCATION/TRAINING PROGRAM

## 2025-03-20 PROCEDURE — 80175 DRUG SCREEN QUAN LAMOTRIGINE: CPT | Performed by: STUDENT IN AN ORGANIZED HEALTH CARE EDUCATION/TRAINING PROGRAM

## 2025-03-20 RX ORDER — LAMOTRIGINE 100 MG/1
100 TABLET ORAL DAILY
COMMUNITY

## 2025-03-20 RX ORDER — CARIPRAZINE 6 MG/1
6 CAPSULE, GELATIN COATED ORAL DAILY
COMMUNITY

## 2025-03-20 RX ORDER — LITHIUM CARBONATE 150 MG/1
150 CAPSULE ORAL DAILY
COMMUNITY

## 2025-03-20 RX ORDER — DEXTROAMPHETAMINE SACCHARATE, AMPHETAMINE ASPARTATE, DEXTROAMPHETAMINE SULFATE AND AMPHETAMINE SULFATE 7.5; 7.5; 7.5; 7.5 MG/1; MG/1; MG/1; MG/1
30 TABLET ORAL DAILY
COMMUNITY

## 2025-03-20 NOTE — PROGRESS NOTES
Harsha García  1980        Subjective     Chief Complaint: Annual    History of Present Illness:  Mr. Harsha García is a 45 y.o. male who presents to clinic for annual.   Doing well overall.     Seeing Dr. Rocio trotter/ Psychiatry. Private Practice. Also seeing Keny Chu for therapy.  Hx of MDD, CRIS, ADHD. On Vraylar, Lithium, Lamictal, Adderall.   Doing well on this combo. Has not yet had Lithium or Lamictal levels checked. Denies hypo/hyperthyroid symptoms.    Due for colonoscopy.   No fam hx of colon cancer, colon polyps, IBD.  Denies blood in stools or change in bowels. No abnormal weight loss.   Prefers Cologuard.    No fam hx of prostate cancer.    BP Readings from Last 5 Encounters:   03/20/25 118/82   10/11/24 125/79   06/20/23 112/78     Due for tdap.     Answers submitted by the patient for this visit:  Review of Systems Questionnaire (Submitted on 3/13/2025)  activity change: No  unexpected weight change: No  rhinorrhea: No  trouble swallowing: No  visual disturbance: No  chest tightness: No  polyuria: No  difficulty urinating: No  joint swelling: No  arthralgias: No  confusion: No  dysphoric mood: Yes        Review of Systems   Constitutional:  Negative for chills, fever and malaise/fatigue.   HENT:  Negative for hearing loss.    Eyes:  Negative for discharge.   Respiratory:  Negative for wheezing.    Cardiovascular:  Negative for chest pain and palpitations.   Gastrointestinal:  Negative for blood in stool, constipation, diarrhea and vomiting.   Genitourinary:  Negative for hematuria and urgency.   Musculoskeletal:  Negative for joint pain and neck pain.   Neurological:  Negative for weakness and headaches.   Endo/Heme/Allergies:  Negative for polydipsia.        PAST HISTORY:     Past Medical History:   Diagnosis Date    ADHD        Past Surgical History:   Procedure Laterality Date    LASIK Bilateral        Family History   Problem Relation Name Age of Onset    Hypertension Mother      Kidney  "failure Mother      Diabetes Father      Diabetes type I Brother      Cancer Paternal Grandmother           MEDICATIONS & ALLERGIES:     Current Outpatient Medications on File Prior to Visit   Medication Sig    dextroamphetamine-amphetamine (ADDERALL) 30 mg Tab Take 30 mg by mouth Daily.    lamoTRIgine (LAMICTAL) 100 MG tablet Take 100 mg by mouth once daily.    lithium carbonate 150 MG capsule Take 150 mg by mouth once daily.    VRAYLAR 6 mg Cap Take 6 mg by mouth once daily.    [DISCONTINUED] ARIPiprazole (ABILIFY) 30 MG Tab     [DISCONTINUED] dextroamphetamine-amphetamine (ADDERALL) 15 mg tablet      No current facility-administered medications on file prior to visit.       Review of patient's allergies indicates:  No Known Allergies    OBJECTIVE:     Vital Signs:  Vitals:    03/20/25 0926   BP: 118/82   BP Location: Left arm   Patient Position: Sitting   Pulse: 88   SpO2: 98%   Weight: 87.1 kg (192 lb 0.3 oz)   Height: 5' 10" (1.778 m)       Body mass index is 27.55 kg/m².     Physical Exam:  Physical Exam  Vitals and nursing note reviewed.   Constitutional:       General: He is not in acute distress.     Appearance: Normal appearance. He is not ill-appearing, toxic-appearing or diaphoretic.   HENT:      Head: Normocephalic and atraumatic.      Right Ear: Tympanic membrane, ear canal and external ear normal.      Left Ear: Tympanic membrane, ear canal and external ear normal.      Mouth/Throat:      Mouth: Mucous membranes are moist.   Eyes:      General: No scleral icterus.        Right eye: No discharge.         Left eye: No discharge.      Conjunctiva/sclera: Conjunctivae normal.   Cardiovascular:      Rate and Rhythm: Normal rate and regular rhythm.      Pulses: Normal pulses.      Heart sounds: No murmur heard.  Pulmonary:      Effort: Pulmonary effort is normal. No respiratory distress.      Breath sounds: Normal breath sounds. No wheezing.   Musculoskeletal:         General: Normal range of motion.      " "Cervical back: Normal range of motion and neck supple. No rigidity.      Right lower leg: No edema.      Left lower leg: No edema.   Lymphadenopathy:      Cervical: No cervical adenopathy.   Skin:     General: Skin is warm and dry.   Neurological:      Mental Status: He is alert and oriented to person, place, and time. Mental status is at baseline.      Gait: Gait normal.   Psychiatric:         Mood and Affect: Mood and affect normal.         Behavior: Behavior normal.            Laboratory  Lab Results   Component Value Date     (H) 10/11/2024     10/11/2024    K 4.5 10/11/2024     10/11/2024    CO2 23 10/11/2024    BUN 11 10/11/2024    CREATININE 1.2 10/11/2024    CALCIUM 9.4 10/11/2024     Lab Results   Component Value Date    HGBA1C 5.3 06/21/2023     No results for input(s): "POCTGLUCOSE" in the last 72 hours.        ASSESSMENT & PLAN:   Mr. Harsha García is a 45 y.o. male who was seen today in clinic for annual. Tdap today. Lithium and Lamictal levels ordered, can f/u results with Psychiatry. SE discussed.    1. Annual physical exam  -     CBC Auto Differential; Future; Expected date: 03/20/2025  -     Comprehensive Metabolic Panel; Future; Expected date: 03/20/2025  -     Hemoglobin A1C; Future; Expected date: 03/20/2025  -     Lipid Panel; Future; Expected date: 03/20/2025  -     TSH; Future; Expected date: 03/20/2025    2. Mild episode of recurrent major depressive disorder  -     LITHIUM LEVEL; Future; Expected date: 03/20/2025  -     LAMOTRIGINE LEVEL; Future; Expected date: 03/20/2025    3. CRIS (generalized anxiety disorder)  -     LITHIUM LEVEL; Future; Expected date: 03/20/2025  -     LAMOTRIGINE LEVEL; Future; Expected date: 03/20/2025    4. Attention deficit hyperactivity disorder (ADHD), combined type  -     LITHIUM LEVEL; Future; Expected date: 03/20/2025  -     LAMOTRIGINE LEVEL; Future; Expected date: 03/20/2025    5. Encounter for colorectal cancer screening  -     Cologuard " Screening (Multitarget Stool DNA); Future; Expected date: 03/20/2025           Barbie Velazquez MD

## 2025-03-24 LAB — LAMOTRIGINE SERPL-MCNC: 3.5 UG/ML (ref 2–15)

## 2025-03-25 ENCOUNTER — RESULTS FOLLOW-UP (OUTPATIENT)
Dept: PRIMARY CARE CLINIC | Facility: CLINIC | Age: 45
End: 2025-03-25

## 2025-07-10 ENCOUNTER — OFFICE VISIT (OUTPATIENT)
Dept: PRIMARY CARE CLINIC | Facility: CLINIC | Age: 45
End: 2025-07-10
Payer: COMMERCIAL

## 2025-07-10 ENCOUNTER — TELEPHONE (OUTPATIENT)
Dept: PRIMARY CARE CLINIC | Facility: CLINIC | Age: 45
End: 2025-07-10

## 2025-07-10 VITALS
TEMPERATURE: 98 F | OXYGEN SATURATION: 98 % | HEART RATE: 98 BPM | DIASTOLIC BLOOD PRESSURE: 80 MMHG | HEIGHT: 70 IN | SYSTOLIC BLOOD PRESSURE: 110 MMHG | BODY MASS INDEX: 26.2 KG/M2 | WEIGHT: 183 LBS

## 2025-07-10 DIAGNOSIS — R22.42 MASS OF LEFT LOWER LEG: Primary | ICD-10-CM

## 2025-07-10 DIAGNOSIS — B35.6 TINEA CRURIS: ICD-10-CM

## 2025-07-10 PROCEDURE — 3079F DIAST BP 80-89 MM HG: CPT | Mod: CPTII,S$GLB,, | Performed by: FAMILY MEDICINE

## 2025-07-10 PROCEDURE — 99999 PR PBB SHADOW E&M-EST. PATIENT-LVL III: CPT | Mod: PBBFAC,,, | Performed by: FAMILY MEDICINE

## 2025-07-10 PROCEDURE — 1159F MED LIST DOCD IN RCRD: CPT | Mod: CPTII,S$GLB,, | Performed by: FAMILY MEDICINE

## 2025-07-10 PROCEDURE — 99214 OFFICE O/P EST MOD 30 MIN: CPT | Mod: S$GLB,,, | Performed by: FAMILY MEDICINE

## 2025-07-10 PROCEDURE — 1160F RVW MEDS BY RX/DR IN RCRD: CPT | Mod: CPTII,S$GLB,, | Performed by: FAMILY MEDICINE

## 2025-07-10 PROCEDURE — 3044F HG A1C LEVEL LT 7.0%: CPT | Mod: CPTII,S$GLB,, | Performed by: FAMILY MEDICINE

## 2025-07-10 PROCEDURE — 3008F BODY MASS INDEX DOCD: CPT | Mod: CPTII,S$GLB,, | Performed by: FAMILY MEDICINE

## 2025-07-10 PROCEDURE — 3074F SYST BP LT 130 MM HG: CPT | Mod: CPTII,S$GLB,, | Performed by: FAMILY MEDICINE

## 2025-07-10 RX ORDER — CLINDAMYCIN HYDROCHLORIDE 300 MG/1
300 CAPSULE ORAL 3 TIMES DAILY
Qty: 21 CAPSULE | Refills: 0 | Status: SHIPPED | OUTPATIENT
Start: 2025-07-10 | End: 2025-07-17

## 2025-07-10 RX ORDER — CLINDAMYCIN HYDROCHLORIDE 300 MG/1
300 CAPSULE ORAL 3 TIMES DAILY
Qty: 14 CAPSULE | Refills: 0 | Status: SHIPPED | OUTPATIENT
Start: 2025-07-10 | End: 2025-07-10

## 2025-07-10 RX ORDER — CLOTRIMAZOLE 1 %
CREAM (GRAM) TOPICAL 2 TIMES DAILY
Qty: 45 G | Refills: 0 | Status: SHIPPED | OUTPATIENT
Start: 2025-07-10

## 2025-07-10 NOTE — PROGRESS NOTES
"      Assessment:     1. Mass of left lower leg    2. Tinea cruris      Plan:     Mass of left lower leg  Clindamycin 300 t.i.d. x7 days  Order in for ultrasound if not better,   consider referral to general surgeon for incision and drainage if persistent    Tinea cruris  Clotrimazole topical t.i.d.  Follow-up if not better        HPI: Harsha García is a 45 y.o. male with is here today for mass leg    History of Present Illness    CHIEF COMPLAINT:  Patient presents today with a mass in the groin area.    HISTORY OF PRESENT ILLNESS:  He reports a mass in the left groin area present for approximately one week, which has been progressively enlarging over the past two days. He initially attributed it to a possible ingrown hair expecting spontaneous resolution. The mass is currently at its largest size, has not drained, and is tender to touch with visible irritation. He denies any known insect bites, fevers, or chills. He recently experienced a mild viral cold over the weekend with worsening symptoms.    SKIN CONCERNS:  He reports chronic dry skin in bilateral groin areas, describing the condition as irritating and similar to diaper rash, exacerbated by moisture and friction. Previous treatment with Jockitch cream was unsuccessful and discontinued.      ROS:  ROS as indicated in HPI.             Current Outpatient Medications   Medication Instructions    clindamycin (CLEOCIN) 300 mg, Oral, 3 times daily    clotrimazole (LOTRIMIN) 1 % cream Topical (Top), 2 times daily    dextroamphetamine-amphetamine (ADDERALL) 30 mg Tab 30 mg, Daily    lithium carbonate 150 mg, Daily       Lab Results   Component Value Date    HGBA1C 5.5 03/20/2025    HGBA1C 5.3 06/21/2023     No results found for: "MICALBCREAT"  Lab Results   Component Value Date    LDLCALC 107.6 03/20/2025    LDLCALC 109.0 06/21/2023    CHOL 173 03/20/2025    HDL 46 03/20/2025    TRIG 97 03/20/2025       Lab Results   Component Value Date     03/20/2025    K " "4.6 03/20/2025     03/20/2025    CO2 24 03/20/2025    GLU 94 03/20/2025    BUN 13 03/20/2025    CREATININE 1.2 03/20/2025    CALCIUM 9.6 03/20/2025    PROT 7.7 03/20/2025    ALBUMIN 4.5 03/20/2025    BILITOT 0.4 03/20/2025    ALKPHOS 56 03/20/2025    AST 20 03/20/2025    ALT 33 03/20/2025    ANIONGAP 12 03/20/2025    WBC 8.27 03/20/2025    HGB 15.5 03/20/2025    HGB 15.8 10/11/2024    HCT 47.5 03/20/2025    MCV 95 03/20/2025     03/20/2025    TSH 2.913 03/20/2025    HEPCAB Non-reactive 10/11/2024       No results found for: "LH", "FSH", "TOTALTESTOST", "PROGESTERONE", "ESTRADIOL", "[id number]", "[id number]", "FERRITIN", "IRON", "TRANSFERRIN", "TIBC", "FESATURATED", "ZINC"      Past Medical History:   Diagnosis Date    ADHD      Past Surgical History:   Procedure Laterality Date    LASIK Bilateral      Vitals:    07/10/25 0813   BP: 110/80   Pulse: 98   Temp: 98.3 °F (36.8 °C)   TempSrc: Oral   SpO2: 98%   Weight: 83 kg (182 lb 15.7 oz)   Height: 5' 10" (1.778 m)   PainSc: 0-No pain     Wt Readings from Last 5 Encounters:   07/10/25 83 kg (182 lb 15.7 oz)   03/20/25 87.1 kg (192 lb 0.3 oz)   10/11/24 87.1 kg (192 lb)   06/20/23 87.5 kg (192 lb 14.4 oz)     Objective:   Physical Exam  Genitourinary:     Comments: 5-6 cm mildly tender mass in left upper medial groin, not fluctuant, no drainage, no erythema warmth, not pulsatile, not lipomatous, Dry flaking in groin, Normal circumcised male, normal size testes, nontender . no inguinal hernia                                    "

## 2025-07-10 NOTE — ASSESSMENT & PLAN NOTE
Clindamycin 300 t.i.d. x7 days  Order in for ultrasound if not better,   consider referral to general surgeon for incision and drainage if persistent

## 2025-07-10 NOTE — TELEPHONE ENCOUNTER
clindamycin (CLEOCIN) 300 MG capsule Take 1 capsule (300 mg total) by mouth 3 (three) times daily. for 7 days     Pharmacy want you to verify Rx directions. Not enough pills requested. You sent in 14 capsules. Did you want to change the quaintly or number of days?